# Patient Record
Sex: FEMALE | Race: WHITE | NOT HISPANIC OR LATINO | Employment: FULL TIME | URBAN - METROPOLITAN AREA
[De-identification: names, ages, dates, MRNs, and addresses within clinical notes are randomized per-mention and may not be internally consistent; named-entity substitution may affect disease eponyms.]

---

## 2019-08-01 ENCOUNTER — LAB (OUTPATIENT)
Dept: LAB | Facility: HOSPITAL | Age: 39
End: 2019-08-01

## 2019-08-01 ENCOUNTER — HOSPITAL ENCOUNTER (OUTPATIENT)
Dept: CT IMAGING | Facility: HOSPITAL | Age: 39
Discharge: HOME | End: 2019-08-01
Admitting: INTERNAL MEDICINE

## 2019-08-01 ENCOUNTER — OFFICE VISIT (OUTPATIENT)
Dept: CARDIOLOGY | Facility: CLINIC | Age: 39
End: 2019-08-01

## 2019-08-01 VITALS
SYSTOLIC BLOOD PRESSURE: 100 MMHG | DIASTOLIC BLOOD PRESSURE: 62 MMHG | HEIGHT: 72 IN | BODY MASS INDEX: 27.55 KG/M2 | HEART RATE: 88 BPM | WEIGHT: 203.4 LBS

## 2019-08-01 DIAGNOSIS — R07.2 PRECORDIAL PAIN: ICD-10-CM

## 2019-08-01 DIAGNOSIS — R06.09 DYSPNEA ON EXERTION: ICD-10-CM

## 2019-08-01 DIAGNOSIS — R79.89 ELEVATED D-DIMER: ICD-10-CM

## 2019-08-01 DIAGNOSIS — I10 ESSENTIAL HYPERTENSION: Primary | ICD-10-CM

## 2019-08-01 DIAGNOSIS — I10 ESSENTIAL HYPERTENSION: ICD-10-CM

## 2019-08-01 DIAGNOSIS — R00.2 PALPITATIONS: ICD-10-CM

## 2019-08-01 DIAGNOSIS — R06.09 DYSPNEA ON EXERTION: Primary | ICD-10-CM

## 2019-08-01 PROBLEM — R60.0 LOCALIZED EDEMA: Status: ACTIVE | Noted: 2019-08-01

## 2019-08-01 PROBLEM — R09.89 LABILE BLOOD PRESSURE: Status: ACTIVE | Noted: 2019-08-01

## 2019-08-01 LAB
ALBUMIN SERPL-MCNC: 4.1 G/DL (ref 3.5–5.2)
ALBUMIN/GLOB SERPL: 1.1 G/DL
ALP SERPL-CCNC: 57 U/L (ref 39–117)
ALT SERPL W P-5'-P-CCNC: 13 U/L (ref 1–33)
ANION GAP SERPL CALCULATED.3IONS-SCNC: 13.3 MMOL/L (ref 5–15)
AST SERPL-CCNC: 18 U/L (ref 1–32)
BASOPHILS # BLD AUTO: 0.04 10*3/MM3 (ref 0–0.2)
BASOPHILS NFR BLD AUTO: 0.5 % (ref 0–1.5)
BILIRUB SERPL-MCNC: 0.3 MG/DL (ref 0.2–1.2)
BUN BLD-MCNC: 14 MG/DL (ref 6–20)
BUN/CREAT SERPL: 16.1 (ref 7–25)
CALCIUM SPEC-SCNC: 9.7 MG/DL (ref 8.6–10.5)
CHLORIDE SERPL-SCNC: 104 MMOL/L (ref 98–107)
CHOLEST SERPL-MCNC: 179 MG/DL (ref 0–200)
CO2 SERPL-SCNC: 23.7 MMOL/L (ref 22–29)
CREAT BLD-MCNC: 0.87 MG/DL (ref 0.57–1)
CREAT BLDA-MCNC: 0.8 MG/DL (ref 0.6–1.3)
D DIMER PPP FEU-MCNC: 1.1 MCGFEU/ML (ref 0–0.49)
DEPRECATED RDW RBC AUTO: 45.5 FL (ref 37–54)
EOSINOPHIL # BLD AUTO: 0.03 10*3/MM3 (ref 0–0.4)
EOSINOPHIL NFR BLD AUTO: 0.4 % (ref 0.3–6.2)
ERYTHROCYTE [DISTWIDTH] IN BLOOD BY AUTOMATED COUNT: 13.3 % (ref 12.3–15.4)
GFR SERPL CREATININE-BSD FRML MDRD: 74 ML/MIN/1.73
GLOBULIN UR ELPH-MCNC: 3.6 GM/DL
GLUCOSE BLD-MCNC: 98 MG/DL (ref 65–99)
HCT VFR BLD AUTO: 43.6 % (ref 34–46.6)
HDLC SERPL-MCNC: 55 MG/DL (ref 40–60)
HGB BLD-MCNC: 13.8 G/DL (ref 12–15.9)
IMM GRANULOCYTES # BLD AUTO: 0.03 10*3/MM3 (ref 0–0.05)
IMM GRANULOCYTES NFR BLD AUTO: 0.4 % (ref 0–0.5)
LDLC SERPL CALC-MCNC: 95 MG/DL (ref 0–100)
LDLC/HDLC SERPL: 1.73 {RATIO}
LYMPHOCYTES # BLD AUTO: 1.58 10*3/MM3 (ref 0.7–3.1)
LYMPHOCYTES NFR BLD AUTO: 20.3 % (ref 19.6–45.3)
MCH RBC QN AUTO: 29.3 PG (ref 26.6–33)
MCHC RBC AUTO-ENTMCNC: 31.7 G/DL (ref 31.5–35.7)
MCV RBC AUTO: 92.6 FL (ref 79–97)
MONOCYTES # BLD AUTO: 0.46 10*3/MM3 (ref 0.1–0.9)
MONOCYTES NFR BLD AUTO: 5.9 % (ref 5–12)
NEUTROPHILS # BLD AUTO: 5.63 10*3/MM3 (ref 1.7–7)
NEUTROPHILS NFR BLD AUTO: 72.5 % (ref 42.7–76)
NRBC BLD AUTO-RTO: 0 /100 WBC (ref 0–0.2)
PLATELET # BLD AUTO: 335 10*3/MM3 (ref 140–450)
PMV BLD AUTO: 10.2 FL (ref 6–12)
POTASSIUM BLD-SCNC: 4.6 MMOL/L (ref 3.5–5.2)
PROT SERPL-MCNC: 7.7 G/DL (ref 6–8.5)
RBC # BLD AUTO: 4.71 10*6/MM3 (ref 3.77–5.28)
SODIUM BLD-SCNC: 141 MMOL/L (ref 136–145)
T-UPTAKE NFR SERPL: 1.31 TBI (ref 0.8–1.3)
T4 SERPL-MCNC: 9.83 MCG/DL (ref 4.5–11.7)
TRIGL SERPL-MCNC: 143 MG/DL (ref 0–150)
TSH SERPL DL<=0.05 MIU/L-ACNC: 1.89 MIU/ML (ref 0.27–4.2)
VLDLC SERPL-MCNC: 28.6 MG/DL (ref 5–40)
WBC NRBC COR # BLD: 7.77 10*3/MM3 (ref 3.4–10.8)

## 2019-08-01 PROCEDURE — 84443 ASSAY THYROID STIM HORMONE: CPT | Performed by: INTERNAL MEDICINE

## 2019-08-01 PROCEDURE — 0 IOPAMIDOL PER 1 ML: Performed by: INTERNAL MEDICINE

## 2019-08-01 PROCEDURE — 82565 ASSAY OF CREATININE: CPT

## 2019-08-01 PROCEDURE — 99204 OFFICE O/P NEW MOD 45 MIN: CPT | Performed by: INTERNAL MEDICINE

## 2019-08-01 PROCEDURE — 85025 COMPLETE CBC W/AUTO DIFF WBC: CPT

## 2019-08-01 PROCEDURE — 80061 LIPID PANEL: CPT

## 2019-08-01 PROCEDURE — 84479 ASSAY OF THYROID (T3 OR T4): CPT | Performed by: INTERNAL MEDICINE

## 2019-08-01 PROCEDURE — 93000 ELECTROCARDIOGRAM COMPLETE: CPT | Performed by: INTERNAL MEDICINE

## 2019-08-01 PROCEDURE — 84436 ASSAY OF TOTAL THYROXINE: CPT | Performed by: INTERNAL MEDICINE

## 2019-08-01 PROCEDURE — 71275 CT ANGIOGRAPHY CHEST: CPT

## 2019-08-01 PROCEDURE — 85379 FIBRIN DEGRADATION QUANT: CPT

## 2019-08-01 PROCEDURE — 36415 COLL VENOUS BLD VENIPUNCTURE: CPT | Performed by: INTERNAL MEDICINE

## 2019-08-01 PROCEDURE — 80053 COMPREHEN METABOLIC PANEL: CPT | Performed by: INTERNAL MEDICINE

## 2019-08-01 RX ORDER — HYDROCHLOROTHIAZIDE 12.5 MG/1
12.5 CAPSULE, GELATIN COATED ORAL DAILY
Qty: 90 CAPSULE | Refills: 3 | Status: SHIPPED | OUTPATIENT
Start: 2019-08-01 | End: 2020-07-01

## 2019-08-01 RX ORDER — LEVONORGESTREL AND ETHINYL ESTRADIOL 0.15-0.03
1 KIT ORAL DAILY
COMMUNITY
Start: 2019-05-23 | End: 2022-05-23

## 2019-08-01 RX ORDER — LISINOPRIL AND HYDROCHLOROTHIAZIDE 12.5; 1 MG/1; MG/1
1 TABLET ORAL DAILY
Refills: 3 | COMMUNITY
Start: 2019-07-16 | End: 2019-08-01

## 2019-08-01 RX ORDER — ASPIRIN 81 MG/1
81 TABLET ORAL DAILY
COMMUNITY
End: 2023-01-06

## 2019-08-01 RX ADMIN — IOPAMIDOL 95 ML: 755 INJECTION, SOLUTION INTRAVENOUS at 15:09

## 2019-08-01 NOTE — PROGRESS NOTES
Date of Office Visit: 2019  Encounter Provider: Latisha Cummings MD  Place of Service: [facility] CARDIOLOGY  Patient Name: Jeanne Wei  :1980      Patient ID:  Jeanne Wei is a 38 y.o. female is here for edema and hypertension.           History of Present Illness    She has had left lower extremity swelling.  She has a history of deep venous thrombus in the greater left saphenous vein.  She had a venous duplex study done successfully 19 which showed no evidence of left lower extremity venous thrombus.    She had venous thrombosis 2 years ago and was treated with aspirin.  She started developing recurrent edema in her leg 2019.  This did not bother her so much although got worse when the weather got warm.  Then about 1 month ago, she began having nausea and chest tightness.  It got acutely worse last Tuesday.  She said her chest was really tight and her heart was pounding.  She was dizzy.  It lasted about 2 hours before she said to go the doctor.  When she got there, heart rate was in the 140s and her blood pressure was 142/98.  They started her on lisinopril HCTZ and did an EKG.  There was no other testing done at that time.  She is here for further evaluation.  She said she started feeling better yesterday but she still intermittently notices her heart pounding and racing.  She has had no syncope but has had some dizziness associated with this.  Also since being on the blood pressure medication, she is noticed that she dizzy is dizzy if she stands up.  Her chest tightness is better but not gone.  Her breathing is fine.    There is a family history of heart disease in her grandparents.  Hypertension runs in her parents.  She is  does not have children works as an .  She uses no alcohol, cigarettes or drugs.  She is very active doing yard work and has always felt well and been healthy except for this clot.  They never  determined why she had the clot.    She does have GERD.  She is never had cancer, renal disease, diabetes, stroke, seizure, hyperlipidemia, microinfarction, rheumatic fever heart failure.    Past Medical History:   Diagnosis Date   • GERD (gastroesophageal reflux disease)          No past surgical history on file.    Current Outpatient Medications on File Prior to Visit   Medication Sig Dispense Refill   • aspirin 81 MG EC tablet Take 81 mg by mouth Daily.     • esomeprazole (nexIUM) 20 MG capsule Take 20 mg by mouth Every Morning Before Breakfast.     • levonorgestrel-ethinyl estradiol (SEASONALE) 0.15-0.03 MG per tablet Take 1 tablet by mouth Daily.     • [DISCONTINUED] lisinopril-hydrochlorothiazide (PRINZIDE,ZESTORETIC) 10-12.5 MG per tablet Take 1 tablet by mouth Daily.  3   • [DISCONTINUED] albuterol (PROVENTIL HFA;VENTOLIN HFA) 108 (90 Base) MCG/ACT inhaler Inhale 2 puffs Every 4 (Four) Hours As Needed for Wheezing or Shortness of Air. 1 inhaler 0   • [DISCONTINUED] azithromycin (ZITHROMAX) 250 MG tablet Take 250 mg by mouth Daily. Take 2 tablets the first day, then 1 tablet daily for 4 days.     • [DISCONTINUED] benzonatate (TESSALON) 200 MG capsule Take 200 mg by mouth 3 (Three) Times a Day As Needed for Cough.     • [DISCONTINUED] brompheniramine-pseudoephedrine-DM 30-2-10 MG/5ML syrup Take 5 mL by mouth 3 (Three) Times a Day As Needed for Congestion or Cough. 118 mL 0   • [DISCONTINUED] methocarbamol (ROBAXIN) 750 MG tablet 1-2 tabs PO Q 8hrs prn pain 30 tablet 0   • [DISCONTINUED] norethindrone-ethinyl estradiol-iron (ESTROSTEP FE) 1-20/1-30/1-35 MG-MCG tablet Take  by mouth Daily.     • [DISCONTINUED] predniSONE (DELTASONE) 20 MG tablet 2 tabs daily for 5 days 10 tablet 0   • [DISCONTINUED] promethazine-dextromethorphan (PROMETHAZINE-DM) 6.25-15 MG/5ML syrup Take 5 mL by mouth 4 (Four) Times a Day As Needed for Cough. 118 mL 0     No current facility-administered medications on file prior to visit.   "      Social History     Socioeconomic History   • Marital status:      Spouse name: Not on file   • Number of children: Not on file   • Years of education: Not on file   • Highest education level: Not on file   Tobacco Use   • Smoking status: Never Smoker   • Smokeless tobacco: Never Used   Substance and Sexual Activity   • Alcohol use: No   • Drug use: Defer   • Sexual activity: Defer           Review of Systems   Constitution: Negative.   HENT: Negative for congestion.    Eyes: Negative for vision loss in left eye and vision loss in right eye.   Respiratory: Negative.  Negative for cough, hemoptysis, shortness of breath, sleep disturbances due to breathing, snoring, sputum production and wheezing.    Endocrine: Negative.    Hematologic/Lymphatic: Negative.    Skin: Negative for poor wound healing and rash.   Musculoskeletal: Negative for falls, gout, muscle cramps and myalgias.   Gastrointestinal: Negative for abdominal pain, diarrhea, dysphagia, hematemesis, melena, nausea and vomiting.   Neurological: Negative for excessive daytime sleepiness, dizziness, headaches, light-headedness, loss of balance, seizures and vertigo.   Psychiatric/Behavioral: Negative for depression and substance abuse. The patient is not nervous/anxious.        Procedures    ECG 12 Lead  Date/Time: 8/1/2019 8:38 AM  Performed by: Latisha Cummings MD  Authorized by: Latisha Cummings MD   Comparison: not compared with previous ECG   Previous ECG: no previous ECG available  Rhythm: sinus rhythm    Clinical impression: normal ECG                Objective:      Vitals:    08/01/19 0832 08/01/19 0833   BP: 112/76 100/62   BP Location: Right arm Left arm   Patient Position: Sitting Sitting   Pulse: 88    Weight: 92.3 kg (203 lb 6.4 oz)    Height: 185.4 cm (73\")      Body mass index is 26.84 kg/m².    Physical Exam   Constitutional: She is oriented to person, place, and time. She appears well-developed and well-nourished. No " distress.   HENT:   Head: Normocephalic and atraumatic.   Eyes: Conjunctivae are normal. No scleral icterus.   Neck: Neck supple. No JVD present. Carotid bruit is not present. No thyromegaly present.   Cardiovascular: Normal rate, regular rhythm, S1 normal, S2 normal, normal heart sounds and intact distal pulses.  No extrasystoles are present. PMI is not displaced. Exam reveals no gallop.   No murmur heard.  Pulses:       Carotid pulses are 2+ on the right side, and 2+ on the left side.       Radial pulses are 2+ on the right side, and 2+ on the left side.        Dorsalis pedis pulses are 2+ on the right side, and 2+ on the left side.        Posterior tibial pulses are 2+ on the right side, and 2+ on the left side.   Pulmonary/Chest: Effort normal and breath sounds normal. No respiratory distress. She has no wheezes. She has no rhonchi. She has no rales. She exhibits no tenderness.   Abdominal: Soft. Bowel sounds are normal. She exhibits no distension, no abdominal bruit and no mass. There is no tenderness.   Musculoskeletal: She exhibits no edema or deformity.   Lymphadenopathy:     She has no cervical adenopathy.   Neurological: She is alert and oriented to person, place, and time. No cranial nerve deficit.   Skin: Skin is warm and dry. No rash noted. She is not diaphoretic. No cyanosis. No pallor. Nails show no clubbing.   Psychiatric: She has a normal mood and affect. Judgment normal.   Vitals reviewed.      Lab Review:       Assessment:      Diagnosis Plan   1. Essential hypertension  Comprehensive Metabolic Panel    Lipid Panel   2. Palpitations  Comprehensive Metabolic Panel    D-dimer, Quantitative    CBC & Differential    Thyroid Panel With TSH    Holter Monitor - 24 Hour   3. Precordial pain  Adult Transthoracic Echo Complete W/ Cont if Necessary Per Protocol    Treadmill Stress Test     1. Chest tightness, worse with activity.  Set up stress and echocardiogram  2. Heart pounding and racing.  Set up  Holter, check blood counts, thyroid and electrolytes.  3. Dizziness with standing.  Blood pressure is now low.  We will place her on just HCTZ 12.5 mg daily.  Not sure she will tolerate this either but this will help the edema in her left leg as well as her blood pressure.  4. History of thrombus in the left leg with acute chest pain and heart pounding.  Check a d-dimer.     Plan:       Plan is as above.  We will have her follow-up in 6 weeks with OSVALDO.

## 2019-08-01 NOTE — NURSING NOTE
Dr. Camacho read CTA Chest PE Protocol and stated no PE, This was called to Dr. Cummings and she said patient may go. Ambulated to car with significant other.

## 2019-08-09 ENCOUNTER — TELEPHONE (OUTPATIENT)
Dept: CARDIOLOGY | Facility: CLINIC | Age: 39
End: 2019-08-09

## 2019-08-09 NOTE — TELEPHONE ENCOUNTER
----- Message from Latisha Cummings MD sent at 8/2/2019  1:18 PM EDT -----  pls get her holter for next week.     Start pepcid 20mg bid - OTC    rm

## 2019-08-13 ENCOUNTER — HOSPITAL ENCOUNTER (OUTPATIENT)
Dept: CARDIOLOGY | Facility: HOSPITAL | Age: 39
Discharge: HOME | End: 2019-08-13

## 2019-08-13 ENCOUNTER — HOSPITAL ENCOUNTER (OUTPATIENT)
Dept: CARDIOLOGY | Facility: HOSPITAL | Age: 39
Discharge: HOME | End: 2019-08-13
Admitting: INTERNAL MEDICINE

## 2019-08-13 ENCOUNTER — TELEPHONE (OUTPATIENT)
Dept: CARDIOLOGY | Facility: CLINIC | Age: 39
End: 2019-08-13

## 2019-08-13 VITALS
BODY MASS INDEX: 26.82 KG/M2 | HEART RATE: 83 BPM | DIASTOLIC BLOOD PRESSURE: 78 MMHG | HEIGHT: 72 IN | WEIGHT: 198 LBS | SYSTOLIC BLOOD PRESSURE: 112 MMHG | OXYGEN SATURATION: 99 %

## 2019-08-13 DIAGNOSIS — R07.2 PRECORDIAL PAIN: ICD-10-CM

## 2019-08-13 LAB
AORTIC ROOT ANNULUS: 2 CM
ASCENDING AORTA: 2.6 CM
BH CV ECHO MEAS - ACS: 1.8 CM
BH CV ECHO MEAS - AO MAX PG (FULL): 2 MMHG
BH CV ECHO MEAS - AO MAX PG: 5.8 MMHG
BH CV ECHO MEAS - AO MEAN PG (FULL): 1.2 MMHG
BH CV ECHO MEAS - AO MEAN PG: 3.6 MMHG
BH CV ECHO MEAS - AO ROOT AREA (BSA CORRECTED): 1.3
BH CV ECHO MEAS - AO ROOT AREA: 6.5 CM^2
BH CV ECHO MEAS - AO ROOT DIAM: 2.9 CM
BH CV ECHO MEAS - AO V2 MAX: 120.8 CM/SEC
BH CV ECHO MEAS - AO V2 MEAN: 86.6 CM/SEC
BH CV ECHO MEAS - AO V2 VTI: 24.7 CM
BH CV ECHO MEAS - AVA(I,A): 2.5 CM^2
BH CV ECHO MEAS - AVA(I,D): 2.5 CM^2
BH CV ECHO MEAS - AVA(V,A): 2.3 CM^2
BH CV ECHO MEAS - AVA(V,D): 2.3 CM^2
BH CV ECHO MEAS - BSA(HAYCOCK): 2.2 M^2
BH CV ECHO MEAS - BSA: 2.1 M^2
BH CV ECHO MEAS - BZI_BMI: 26.1 KILOGRAMS/M^2
BH CV ECHO MEAS - BZI_METRIC_HEIGHT: 185.4 CM
BH CV ECHO MEAS - BZI_METRIC_WEIGHT: 89.8 KG
BH CV ECHO MEAS - EDV(MOD-SP2): 92 ML
BH CV ECHO MEAS - EDV(MOD-SP4): 105 ML
BH CV ECHO MEAS - EDV(TEICH): 99.7 ML
BH CV ECHO MEAS - EF(CUBED): 70 %
BH CV ECHO MEAS - EF(MOD-BP): 60 %
BH CV ECHO MEAS - EF(MOD-SP2): 63 %
BH CV ECHO MEAS - EF(MOD-SP4): 58.1 %
BH CV ECHO MEAS - EF(TEICH): 61.6 %
BH CV ECHO MEAS - ESV(MOD-SP2): 34 ML
BH CV ECHO MEAS - ESV(MOD-SP4): 44 ML
BH CV ECHO MEAS - ESV(TEICH): 38.3 ML
BH CV ECHO MEAS - FS: 33.1 %
BH CV ECHO MEAS - IVS/LVPW: 1.1
BH CV ECHO MEAS - IVSD: 0.92 CM
BH CV ECHO MEAS - LAT PEAK E' VEL: 11 CM/SEC
BH CV ECHO MEAS - LV DIASTOLIC VOL/BSA (35-75): 49 ML/M^2
BH CV ECHO MEAS - LV MASS(C)D: 136.7 GRAMS
BH CV ECHO MEAS - LV MASS(C)DI: 63.8 GRAMS/M^2
BH CV ECHO MEAS - LV MAX PG: 3.9 MMHG
BH CV ECHO MEAS - LV MEAN PG: 2.4 MMHG
BH CV ECHO MEAS - LV SYSTOLIC VOL/BSA (12-30): 20.5 ML/M^2
BH CV ECHO MEAS - LV V1 MAX: 98.5 CM/SEC
BH CV ECHO MEAS - LV V1 MEAN: 72.5 CM/SEC
BH CV ECHO MEAS - LV V1 VTI: 21.4 CM
BH CV ECHO MEAS - LVIDD: 4.6 CM
BH CV ECHO MEAS - LVIDS: 3.1 CM
BH CV ECHO MEAS - LVLD AP2: 6.5 CM
BH CV ECHO MEAS - LVLD AP4: 7 CM
BH CV ECHO MEAS - LVLS AP2: 4.9 CM
BH CV ECHO MEAS - LVLS AP4: 5.7 CM
BH CV ECHO MEAS - LVOT AREA (M): 2.8 CM^2
BH CV ECHO MEAS - LVOT AREA: 2.9 CM^2
BH CV ECHO MEAS - LVOT DIAM: 1.9 CM
BH CV ECHO MEAS - LVPWD: 0.85 CM
BH CV ECHO MEAS - MED PEAK E' VEL: 9 CM/SEC
BH CV ECHO MEAS - MV A DUR: 0.11 SEC
BH CV ECHO MEAS - MV A MAX VEL: 54.8 CM/SEC
BH CV ECHO MEAS - MV DEC SLOPE: 385.5 CM/SEC^2
BH CV ECHO MEAS - MV DEC TIME: 0.17 SEC
BH CV ECHO MEAS - MV E MAX VEL: 70.8 CM/SEC
BH CV ECHO MEAS - MV E/A: 1.3
BH CV ECHO MEAS - MV MAX PG: 2.4 MMHG
BH CV ECHO MEAS - MV MEAN PG: 1.4 MMHG
BH CV ECHO MEAS - MV P1/2T MAX VEL: 85.5 CM/SEC
BH CV ECHO MEAS - MV P1/2T: 65 MSEC
BH CV ECHO MEAS - MV V2 MAX: 77.4 CM/SEC
BH CV ECHO MEAS - MV V2 MEAN: 55.2 CM/SEC
BH CV ECHO MEAS - MV V2 VTI: 21 CM
BH CV ECHO MEAS - MVA P1/2T LCG: 2.6 CM^2
BH CV ECHO MEAS - MVA(P1/2T): 3.4 CM^2
BH CV ECHO MEAS - MVA(VTI): 2.9 CM^2
BH CV ECHO MEAS - PA MAX PG (FULL): 7 MMHG
BH CV ECHO MEAS - PA MAX PG: 10.1 MMHG
BH CV ECHO MEAS - PA V2 MAX: 158.8 CM/SEC
BH CV ECHO MEAS - PULM A REVS DUR: 0.11 SEC
BH CV ECHO MEAS - PULM A REVS VEL: 34.4 CM/SEC
BH CV ECHO MEAS - PULM DIAS VEL: 54.8 CM/SEC
BH CV ECHO MEAS - PULM S/D: 1.2
BH CV ECHO MEAS - PULM SYS VEL: 67.4 CM/SEC
BH CV ECHO MEAS - PVA(V,A): 1.6 CM^2
BH CV ECHO MEAS - PVA(V,D): 1.6 CM^2
BH CV ECHO MEAS - QP/QS: 0.85
BH CV ECHO MEAS - RAP SYSTOLE: 3 MMHG
BH CV ECHO MEAS - RV MAX PG: 3.1 MMHG
BH CV ECHO MEAS - RV MEAN PG: 1.9 MMHG
BH CV ECHO MEAS - RV V1 MAX: 88.3 CM/SEC
BH CV ECHO MEAS - RV V1 MEAN: 63.6 CM/SEC
BH CV ECHO MEAS - RV V1 VTI: 18.6 CM
BH CV ECHO MEAS - RVOT AREA: 2.8 CM^2
BH CV ECHO MEAS - RVOT DIAM: 1.9 CM
BH CV ECHO MEAS - RVSP: 27 MMHG
BH CV ECHO MEAS - SI(AO): 75 ML/M^2
BH CV ECHO MEAS - SI(CUBED): 32.8 ML/M^2
BH CV ECHO MEAS - SI(LVOT): 28.6 ML/M^2
BH CV ECHO MEAS - SI(MOD-SP2): 27.1 ML/M^2
BH CV ECHO MEAS - SI(MOD-SP4): 28.5 ML/M^2
BH CV ECHO MEAS - SI(TEICH): 28.7 ML/M^2
BH CV ECHO MEAS - SUP REN AO DIAM: 1.6 CM
BH CV ECHO MEAS - SV(AO): 160.7 ML
BH CV ECHO MEAS - SV(CUBED): 70.3 ML
BH CV ECHO MEAS - SV(LVOT): 61.3 ML
BH CV ECHO MEAS - SV(MOD-SP2): 58 ML
BH CV ECHO MEAS - SV(MOD-SP4): 61 ML
BH CV ECHO MEAS - SV(RVOT): 52.4 ML
BH CV ECHO MEAS - SV(TEICH): 61.5 ML
BH CV ECHO MEAS - TAPSE (>1.6): 2 CM2
BH CV ECHO MEAS - TR MAX VEL: 247 CM/SEC
BH CV ECHO MEASUREMENTS AVERAGE E/E' RATIO: 7.08
BH CV STRESS BP STAGE 1: NORMAL
BH CV STRESS BP STAGE 2: NORMAL
BH CV STRESS BP STAGE 3: NORMAL
BH CV STRESS DURATION MIN STAGE 1: 3
BH CV STRESS DURATION MIN STAGE 2: 3
BH CV STRESS DURATION MIN STAGE 3: 2
BH CV STRESS DURATION SEC STAGE 1: 0
BH CV STRESS DURATION SEC STAGE 2: 0
BH CV STRESS DURATION SEC STAGE 3: 0
BH CV STRESS GRADE STAGE 1: 10
BH CV STRESS GRADE STAGE 2: 12
BH CV STRESS GRADE STAGE 3: 14
BH CV STRESS HR STAGE 1: 117
BH CV STRESS HR STAGE 2: 148
BH CV STRESS HR STAGE 3: 176
BH CV STRESS METS STAGE 1: 5
BH CV STRESS METS STAGE 2: 7.5
BH CV STRESS METS STAGE 3: 10
BH CV STRESS PROTOCOL 1: NORMAL
BH CV STRESS RECOVERY BP: NORMAL MMHG
BH CV STRESS RECOVERY HR: 117 BPM
BH CV STRESS SPEED STAGE 1: 1.7
BH CV STRESS SPEED STAGE 2: 2.5
BH CV STRESS SPEED STAGE 3: 3.4
BH CV STRESS STAGE 1: 1
BH CV STRESS STAGE 2: 2
BH CV STRESS STAGE 3: 3
BH CV XLRA - RV BASE: 2.8 CM
BH CV XLRA - TDI S': 15 CM/SEC
LEFT ATRIUM VOLUME INDEX: 19 ML/M2
LEFT ATRIUM VOLUME: 39 CM3
LV EF 2D ECHO EST: 60 %
MAXIMAL PREDICTED HEART RATE: 184 BPM
MAXIMAL PREDICTED HEART RATE: 184 BPM
PERCENT MAX PREDICTED HR: 95.65 %
SINUS: 2.5 CM
STJ: 2.6 CM
STRESS BASELINE BP: NORMAL MMHG
STRESS BASELINE HR: 87 BPM
STRESS PERCENT HR: 113 %
STRESS POST ESTIMATED WORKLOAD: 9 METS
STRESS POST EXERCISE DUR MIN: 8 MIN
STRESS POST EXERCISE DUR SEC: 0 SEC
STRESS POST PEAK BP: NORMAL MMHG
STRESS POST PEAK HR: 176 BPM
STRESS TARGET HR: 156 BPM
STRESS TARGET HR: 156 BPM

## 2019-08-13 PROCEDURE — 93018 CV STRESS TEST I&R ONLY: CPT | Performed by: INTERNAL MEDICINE

## 2019-08-13 PROCEDURE — 93306 TTE W/DOPPLER COMPLETE: CPT | Performed by: INTERNAL MEDICINE

## 2019-08-13 PROCEDURE — 93016 CV STRESS TEST SUPVJ ONLY: CPT | Performed by: INTERNAL MEDICINE

## 2019-08-13 PROCEDURE — 93017 CV STRESS TEST TRACING ONLY: CPT

## 2019-08-13 PROCEDURE — 93306 TTE W/DOPPLER COMPLETE: CPT

## 2019-08-13 PROCEDURE — 25010000002 PERFLUTREN (DEFINITY) 8.476 MG IN SODIUM CHLORIDE 0.9 % 10 ML INJECTION: Performed by: INTERNAL MEDICINE

## 2019-08-13 RX ADMIN — PERFLUTREN 1.5 ML: 6.52 INJECTION, SUSPENSION INTRAVENOUS at 08:21

## 2019-08-13 NOTE — TELEPHONE ENCOUNTER
08/13/19  2:49 PM  Jeanne Wei  1980  Home Phone [phone]   Work Phone [phone]       Jeanne Wei is a patient of Dr Woody.  I had called her today to discuss her recent testing that was normal.    She would like to know if there was a reason for the elevation of the D-dimer (1.10), since there was no blood clot found on the CT angiogram of the chest?  And if she will need further testing?   Thanks  Digna Del Valle RN  Triage nurse

## 2019-08-21 ENCOUNTER — TELEPHONE (OUTPATIENT)
Dept: CARDIOLOGY | Facility: CLINIC | Age: 39
End: 2019-08-21

## 2019-08-21 NOTE — TELEPHONE ENCOUNTER
Pt called and c/o frequently palpitations for a period of time and when it happen she feels dizzy, hot flash elevated HR. Pt is c/o swelling of the hand too.....Does pt need to f/u with NP. She don' have scheduled appointment.....Please advise    Current med list  HCTZ 12.5 MG         Thanks  Fatmata MUNGUIA

## 2019-08-23 ENCOUNTER — OFFICE VISIT (OUTPATIENT)
Dept: CARDIOLOGY | Facility: CLINIC | Age: 39
End: 2019-08-23

## 2019-08-23 VITALS
HEART RATE: 86 BPM | WEIGHT: 205.8 LBS | HEIGHT: 72 IN | RESPIRATION RATE: 16 BRPM | BODY MASS INDEX: 27.87 KG/M2 | DIASTOLIC BLOOD PRESSURE: 76 MMHG | SYSTOLIC BLOOD PRESSURE: 112 MMHG

## 2019-08-23 DIAGNOSIS — R42 DIZZINESS: ICD-10-CM

## 2019-08-23 DIAGNOSIS — R00.2 INTERMITTENT PALPITATIONS: Primary | ICD-10-CM

## 2019-08-23 DIAGNOSIS — I10 ESSENTIAL HYPERTENSION: ICD-10-CM

## 2019-08-23 DIAGNOSIS — R06.09 DYSPNEA ON EXERTION: ICD-10-CM

## 2019-08-23 PROCEDURE — 99214 OFFICE O/P EST MOD 30 MIN: CPT | Performed by: NURSE PRACTITIONER

## 2019-08-23 PROCEDURE — 93000 ELECTROCARDIOGRAM COMPLETE: CPT | Performed by: NURSE PRACTITIONER

## 2019-08-23 NOTE — PROGRESS NOTES
Date of Office Visit: 2019  Encounter Provider: OSVALDO Watson  Place of Service: [facility] CARDIOLOGY  Patient Name: Jeanne Wei  :1980    Chief Complaint   Patient presents with   • Palpitations   • Dizziness   :     HPI: Jeanne Wei is a 38 y.o. female  with history of palpitation, hypertension, GERD, atypical chest pain, and deep vein thrombosis in the greater left saphenous vein.  She is followed by Dr. Cummings.  I will be seeing her for the first time today and have reviewed her medical record.  The family history of heart disease in her grandparents.  Hypertension runs in her parents.    She had venous thrombosis  with vascular started developing recurrent edema venous duplex of the lower extremity 2019 which showed no evidence of left lower extremity venous thromboses.  In July she began having nausea and chest tightness which got worse 40s when she arrived to her PCP and blood pressure was 142/98.  She was started on lisinopril and HCTZ.  ECG was performed.  She then had intermittent heart pounding and racing.  Thyroid panel was normal.  D-dimer was slightly elevated but CT angiogram of the chest negative for pulmonary embolism or aortic disease.  Lipid panel was unremarkable LDL 95.  24-hour Holter monitor was normal.  Patient complained of dizziness, chest pain and flushing had no correlations.  PVCs occurred rarely with ventricular couplets and bigeminy.  Echocardiogram showed normal left ventricular systolic function with an EF of 60% and no valvular disease.  Treadmill stress test showed no ECG evidence of myocardial ischemia, negative clinical evidence and findings consistent with normal ECG stress test.  She exercised for 8 minutes achieved 95% of maximum predicted heart rate exercised at 9 metabolic equivalents.  Blood pressure and heart rate had a normal response.  Her blood pressure was 100/62 so lisinopril was stopped and  "she was placed on 12.5 mg hydrochlorothiazide.    She presents today for reevaluation.  She has had increased intermittent palpitations.  These do not occur daily but when they do occur she describes them as a skipped or fast beat.  She has associated dizziness, fatigue and feels near syncopal but no praveen syncope.  She also has a flushing or \"hot\" sensation.  These do not seem to have correlation with her menstrual cycle.  She has actually cut back on eating diet drinks and has not had one in the last month.  She denies chest pain tightness pressure.  She has some intermittent swelling in the left leg which is unchanged.  She has history of snoring no witnessed apnea.  She has occasional morning headache has never been tested for sleep apnea but does not think she has issues with sleep.      Allergies   Allergen Reactions   • Amoxicillin Nausea And Vomiting   • Penicillins Other (See Comments)     Rash,migraine,vomiting       Past Medical History:   Diagnosis Date   • Chest pain    • Dizziness    • GERD (gastroesophageal reflux disease)    • Hypertension    • Palpitation    • PVC (premature ventricular contraction)        History reviewed. No pertinent surgical history.      Family and social history reviewed.     Review of Systems   Constitution: Positive for malaise/fatigue.   Cardiovascular: Positive for leg swelling and palpitations.   Neurological: Positive for dizziness and light-headedness.     All other systems were reviewed and are negative          Objective:     Vitals:    08/23/19 1445   BP: 112/76   BP Location: Left arm   Patient Position: Sitting   Cuff Size: Adult   Pulse: 86   Resp: 16   Weight: 93.4 kg (205 lb 12.8 oz)   Height: 185.4 cm (73\")     Body mass index is 27.15 kg/m².    PHYSICAL EXAM:  Physical Exam   Constitutional: She is oriented to person, place, and time. She appears well-developed and well-nourished. No distress.   HENT:   Head: Normocephalic.   Eyes: Conjunctivae are normal. "   Neck: Normal range of motion. No JVD present.   Cardiovascular: Normal rate, regular rhythm, normal heart sounds and intact distal pulses.   No murmur heard.  Pulses:       Carotid pulses are 2+ on the right side, and 2+ on the left side.       Radial pulses are 2+ on the right side, and 2+ on the left side.        Posterior tibial pulses are 2+ on the right side, and 2+ on the left side.   Pulmonary/Chest: Effort normal and breath sounds normal. No respiratory distress. She has no wheezes. She has no rhonchi. She has no rales. She exhibits no tenderness.   Abdominal: Soft. Bowel sounds are normal. She exhibits no distension.   Musculoskeletal: Normal range of motion. She exhibits no edema.   Neurological: She is alert and oriented to person, place, and time.   Skin: Skin is warm, dry and intact. No rash noted. She is not diaphoretic. No cyanosis.   Psychiatric: She has a normal mood and affect. Her behavior is normal. Judgment and thought content normal.         ECG 12 Lead  Date/Time: 8/23/2019 4:13 PM  Performed by: Millicent Pereyra APRN  Authorized by: Millicent Pereyra APRN   Comparison: compared with previous ECG from 8/1/2019  Similar to previous ECG  Rhythm: sinus rhythm  Ectopy: unifocal PVCs  Rate: normal  QRS axis: normal    Clinical impression: abnormal EKG            Current Outpatient Medications   Medication Sig Dispense Refill   • aspirin 81 MG EC tablet Take 81 mg by mouth Daily.     • esomeprazole (nexIUM) 20 MG capsule Take 20 mg by mouth Every Morning Before Breakfast.     • hydrochlorothiazide (MICROZIDE) 12.5 MG capsule Take 1 capsule by mouth Daily. 90 capsule 3   • levonorgestrel-ethinyl estradiol (SEASONALE) 0.15-0.03 MG per tablet Take 1 tablet by mouth Daily.       No current facility-administered medications for this visit.      Assessment:       Diagnosis Plan   1. Intermittent palpitations  Cardiac Event Monitor   2. Essential hypertension     3. Dyspnea on exertion     4. Dizziness   Cardiac Event Monitor        Orders Placed This Encounter   Procedures   • Cardiac Event Monitor     Standing Status:   Future     Number of Occurrences:   1     Standing Expiration Date:   8/22/2020     Order Specific Question:   Reason for exam?     Answer:   Palpitations     Order Specific Question:   Reason for exam?     Answer:   Dizziness   • ECG 12 Lead     This order was created via procedure documentation         Plan:       1.  Intermittent palpitations with unremarkable 24-hour Holter earlier this month. She is having recurrent palpitations with associated dizziness, hot flash, and near syncope.  She will have a 30-day event monitor placed today  2.  Hypertension we stopped lisinopril/HCTZ due to low blood pressure.  Now on hydrochlorothiazide 12.5 mg well controlled  3.  Atypical chest pain-normal treadmill exercise stress test August 2019, normal left ventricular systolic function on echo with no valvular disease.  4.  History of left lower extremity thromboses approximately 2017- duplex June 2019  5.  Birth control use      Follow up to be determined once  Event monitor is resulted.         It has been a pleasure to participate in this patient's care.      Thank you,  OSVALDO Watson      **Jose Disclaimer:**  Much of this encounter note is an electronic transcription/translation of spoken language to printed text. The electronic translation of spoken language may permit erroneous, or at times, nonsensical words or phrases to be inadvertently transcribed. Although I have reviewed the note for such errors, some may still exist.

## 2019-09-10 ENCOUNTER — CLINICAL SUPPORT (OUTPATIENT)
Dept: CARDIOLOGY | Facility: CLINIC | Age: 39
End: 2019-09-10

## 2019-09-10 DIAGNOSIS — I49.3 PVC (PREMATURE VENTRICULAR CONTRACTION): Primary | ICD-10-CM

## 2019-09-10 DIAGNOSIS — R00.2 PALPITATIONS: ICD-10-CM

## 2019-09-10 PROCEDURE — 93000 ELECTROCARDIOGRAM COMPLETE: CPT | Performed by: NURSE PRACTITIONER

## 2019-09-10 NOTE — PROGRESS NOTES
Procedure     ECG 12 Lead  Date/Time: 9/10/2019 11:28 AM  Performed by: Millicent Pereyra APRN  Authorized by: Millicent Pereyra APRN   Comparison: compared with previous ECG   Similar to previous ECG  Rhythm: sinus rhythm  Ectopy: unifocal PVCs and trigeminy  Rate: normal  QRS axis: normal  Other findings: non-specific ST-T wave changes    Clinical impression: non-specific ECG  Comments: No significant change        patient here for EKG  Having Palpitations  She is wearing ZIo patch. We will continue the ZIo until the end of monitoring then evaluate PVC burden and discuss beta blockade therapy at that time.   Discussed with ROXANA Chicas- no changes today patient is ok to leave.    AL

## 2019-09-25 ENCOUNTER — TELEPHONE (OUTPATIENT)
Dept: CARDIOLOGY | Facility: CLINIC | Age: 39
End: 2019-09-25

## 2019-09-25 PROBLEM — I49.3 UNIFOCAL PVCS: Status: ACTIVE | Noted: 2019-09-25

## 2019-09-25 RX ORDER — METOPROLOL SUCCINATE 25 MG/1
12.5 TABLET, EXTENDED RELEASE ORAL NIGHTLY
Qty: 45 TABLET | Refills: 3 | Status: SHIPPED | OUTPATIENT
Start: 2019-09-25 | End: 2019-11-25

## 2019-09-25 NOTE — TELEPHONE ENCOUNTER
Spoke with patient. I am sending and metoprolol succinate 12.5 mg for patient to take daily at night.           Danielle- Please arrange 6-week follow-up with me and  Schedule for six-month follow-up with Dr. Cummings, if available .

## 2019-09-30 NOTE — TELEPHONE ENCOUNTER
Patient called the office today with concerns about her metoprolol 25 mg 0.5mg nightly that was started on 09/26/19.  She stated that every since she stared the medication she is not feeling any better than what she was. She's been feeling really SOB, fatigue all the time, dizzy, denies any chest pain. Her B/P has been running 100/115's- 60-80's. Her HR is running in the 50-60's. The highest HR was 115-118 one time.     Patient can be reached at [phone]

## 2019-09-30 NOTE — TELEPHONE ENCOUNTER
Please let her know that her BP and HR are ok. She had a lot of these complaints prior to starting 1/2 tablet at night. Would give it another 1-2 weeks before trying another agent.    Contraindicated

## 2019-11-05 ENCOUNTER — OFFICE VISIT (OUTPATIENT)
Dept: CARDIOLOGY | Facility: CLINIC | Age: 39
End: 2019-11-05

## 2019-11-05 VITALS
BODY MASS INDEX: 28.31 KG/M2 | HEIGHT: 72 IN | SYSTOLIC BLOOD PRESSURE: 110 MMHG | DIASTOLIC BLOOD PRESSURE: 70 MMHG | WEIGHT: 209 LBS | HEART RATE: 83 BPM

## 2019-11-05 DIAGNOSIS — I10 ESSENTIAL HYPERTENSION: ICD-10-CM

## 2019-11-05 DIAGNOSIS — I49.3 PVC (PREMATURE VENTRICULAR CONTRACTION): Primary | ICD-10-CM

## 2019-11-05 PROCEDURE — 93000 ELECTROCARDIOGRAM COMPLETE: CPT | Performed by: NURSE PRACTITIONER

## 2019-11-05 PROCEDURE — 99213 OFFICE O/P EST LOW 20 MIN: CPT | Performed by: NURSE PRACTITIONER

## 2019-11-05 NOTE — PROGRESS NOTES
Date of Office Visit: 19  Encounter Provider: OSVALDO Watson  Place of Service: [facility] CARDIOLOGY  Patient Name: Jeanne Wei  :1980    Chief Complaint   Patient presents with   • Palpitations   • Dizziness   • Follow-up   :     HPI: Jeanne Wei is a 38 y.o. female  with history of palpitation, hypertension, GERD, atypical chest pain, and deep vein thrombosis in the greater left saphenous vein.  She is followed by Dr. Cummings.   I will see her in follow-up today and have reviewed her medical record.  She has a family history of heart disease in her grandparents and hypertension runs in her parents.      She had venous thrombosis  with vascular started developing recurrent edema venous duplex of the lower extremity 2019 which showed no evidence of left lower extremity venous thromboses.  In July she began having nausea and chest tightness which got worse 40s when she arrived to her PCP and blood pressure was 142/98.  She was started on lisinopril and HCTZ.  ECG was performed.  She then had intermittent heart pounding and racing.  Thyroid panel was normal.  D-dimer was slightly elevated but CT angiogram of the chest negative for pulmonary embolism or aortic disease.  Lipid panel was unremarkable LDL 95.  24-hour Holter monitor was normal.  Patient complained of dizziness, chest pain and flushing had no correlations.  PVCs occurred rarely with ventricular couplets and bigeminy.  Echocardiogram showed normal left ventricular systolic function with an EF of 60% and no valvular disease.  Treadmill stress test showed no ECG evidence of myocardial ischemia, negative clinical evidence and findings consistent with normal ECG stress test.  She exercised for 8 minutes achieved 95% of maximum predicted heart rate exercised at 9 metabolic equivalents.  Blood pressure and heart rate had a normal response.  Her blood pressure was 100/62 so lisinopril was  "stopped and she was placed on 12.5 mg hydrochlorothiazide.  She continued to have increased intermittent palpitations which were not daily and described as a skipped beat or fast beat.  These were associated with dizziness, fatigue and near syncope.  30-day event monitor showed symptomatic unifocal PVCs with skipped beating, heart racing, shortness of breath and lightheadedness associated.  She was started on metoprolol succinate 12.5 mg daily at night.  She was to follow-up in 6 weeks.    She presents today in follow-up.  Her palpitations have improved over the last 6 weeks.  She has had only 3-4 episodes prior to starting metoprolol succinate she had these almost daily.  She is now wearing compression stockings for left lower extremity edema which has improved.  She denies chest pain tightness pressure.  Episodes of shortness of breath with exertion and occasional lightheadedness.  She is currently being treated with an antibiotic for bladder and yeast infection which she is to complete today.  She is switched jobs so her stress level has decreased.  She tells me that she was helping to care for her father-in-law and then her mother-in-law  suddenly in the last couple months.    Allergies   Allergen Reactions   • Amoxicillin Nausea And Vomiting   • Penicillins Other (See Comments)     Rash,migraine,vomiting       Past Medical History:   Diagnosis Date   • Chest pain    • Dizziness    • GERD (gastroesophageal reflux disease)    • Hypertension    • Palpitation    • PVC (premature ventricular contraction)        Past Surgical History:   Procedure Laterality Date   • ENDOSCOPY           Family and social history reviewed.     ROS  All other systems were reviewed and are negative          Objective:     Vitals:    19 1511   BP: 110/70   BP Location: Left arm   Patient Position: Sitting   Pulse: 83   Weight: 94.8 kg (209 lb)   Height: 185.4 cm (73\")     Body mass index is 27.57 kg/m².    PHYSICAL " EXAM:  Physical Exam   Constitutional: She is oriented to person, place, and time. She appears well-developed and well-nourished. No distress.   HENT:   Head: Normocephalic.   Eyes: Conjunctivae are normal.   Neck: Normal range of motion. No JVD present.   Cardiovascular: Normal rate, regular rhythm, normal heart sounds and intact distal pulses.   No murmur heard.  Pulses:       Carotid pulses are 2+ on the right side, and 2+ on the left side.       Radial pulses are 2+ on the right side, and 2+ on the left side.        Posterior tibial pulses are 2+ on the right side, and 2+ on the left side.   Pulmonary/Chest: Effort normal and breath sounds normal. No respiratory distress. She has no wheezes. She has no rhonchi. She has no rales. She exhibits no tenderness.   Abdominal: Soft. Bowel sounds are normal. She exhibits no distension.   Musculoskeletal: Normal range of motion. She exhibits no edema.   Neurological: She is alert and oriented to person, place, and time.   Skin: Skin is warm, dry and intact. No rash noted. She is not diaphoretic. No cyanosis.   Psychiatric: She has a normal mood and affect. Her behavior is normal. Judgment and thought content normal.         ECG 12 Lead  Date/Time: 11/5/2019 3:55 PM  Performed by: Millicent Pereyra APRN  Authorized by: Millicent Pereyra APRN   Comparison: compared with previous ECG   Similar to previous ECG  Rhythm: sinus rhythm  Ectopy: unifocal PVCs  Rate: normal    Clinical impression: non-specific ECG  Comments: Otherwise normal            Current Outpatient Medications   Medication Sig Dispense Refill   • aspirin 81 MG EC tablet Take 81 mg by mouth Daily.     • esomeprazole (nexIUM) 20 MG capsule Take 20 mg by mouth Daily As Needed.     • hydrochlorothiazide (MICROZIDE) 12.5 MG capsule Take 1 capsule by mouth Daily. 90 capsule 3   • levonorgestrel-ethinyl estradiol (SEASONALE) 0.15-0.03 MG per tablet Take 1 tablet by mouth Daily.     • metoprolol succinate XL (TOPROL-XL) 25  MG 24 hr tablet Take 0.5 tablets by mouth Every Night. 45 tablet 3     No current facility-administered medications for this visit.      Assessment:       Diagnosis Plan   1. PVC (premature ventricular contraction)     2. Essential hypertension          Orders Placed This Encounter   Procedures   • ECG 12 Lead     This order was created via procedure documentation         Plan:       1.   This is a 38-year-old female with symptomatic premature ventricular contractions   noted on 30-day event monitor.  Symptoms had improved on metoprolol succinate 12.5 mg nightly.  She believes she is experiencing fatigue from the beta-blocker.  Her stress level has decreased she will take 12.5 mg every other night x3 to 4 days then stop it.  If she has recurrent symptoms may need to use it on an as-needed basis.  May need to try calcium channel blocker to see if she tolerates that better  2.  Hypertension blood pressure appears stable on hydrochlorothiazide 12.5 mg daily.  As above  3.  Atypical chest pain-normal treadmill exercise stress test August 2019, normal left ventricular systolic function on echo with no valvular disease.  No complaint of chest pain today  4.  History of left lower extremity thromboses approximately 2017 with negative left lower extremity duplex June 2019  5.  Birth control use  6.  Dependent edema improved with compression socks  Follow-up in 4 months call with questions or concerns            It has been a pleasure to participate in this patient's care.      Thank you,  OSVALDO Watson      **I used Dragon to dictate this note:**

## 2019-11-25 ENCOUNTER — TELEPHONE (OUTPATIENT)
Dept: CARDIOLOGY | Facility: CLINIC | Age: 39
End: 2019-11-25

## 2019-11-25 RX ORDER — DILTIAZEM HYDROCHLORIDE 120 MG/1
120 CAPSULE, COATED, EXTENDED RELEASE ORAL NIGHTLY
Qty: 30 CAPSULE | Refills: 1 | Status: SHIPPED | OUTPATIENT
Start: 2019-11-25 | End: 2020-02-03

## 2019-11-25 NOTE — TELEPHONE ENCOUNTER
From the last OV notes 11/05/19          Plan:       1.   This is a 38-year-old female with symptomatic premature ventricular contractions   noted on 30-day event monitor.  Symptoms had improved on metoprolol succinate 12.5 mg nightly.  She believes she is experiencing fatigue from the beta-blocker.  Her stress level has decreased she will take 12.5 mg every other night x3 to 4 days then stop it.  If she has recurrent symptoms may need to use it on an as-needed basis.  May need to try calcium channel blocker to see if she tolerates that better        Pt called and states that's he tried taking Toprol XL 12.5mg every other night but it did not work. Her heart palpitations came back. She currently taking Toprol xl 12.5 mg nightly. She is still c/o fatigue.    She wants to know if she can try calcium channel blocker to see if she tolerates that better?      Thanks  Fatmata MUNGUIA

## 2019-12-30 ENCOUNTER — TELEPHONE (OUTPATIENT)
Dept: CARDIOLOGY | Facility: CLINIC | Age: 39
End: 2019-12-30

## 2019-12-30 NOTE — TELEPHONE ENCOUNTER
"12/30/19  10:06 AM  Jeanne Wei  1980  Home Phone [phone]   Work Phone [phone]       Jeanne Wei is a patient of Dr Woody, last seen by Millicent.  Patient is calling in today to let you know that she is still feeling dizzy and lightheaded.  Not feeling well over all.  Her heart rate has been 115-130 at rest.  She said she was feeling dizzy last night, like she \"may pass out.\"  She is c/o a mild headache this morning. bp ranging 140-150/109-120.  HR closer to 115 today. But it took about 2 hours to come down from 130, last night.      cardiac meds reviewed  Diltiazem 120mg nightly  Hydrochlorothiazide 12.5mg daily    Please let me know how to proceed  Thanks  Digna Del Valle RN  Triage nurse    "

## 2020-02-03 RX ORDER — DILTIAZEM HYDROCHLORIDE 120 MG/1
CAPSULE, COATED, EXTENDED RELEASE ORAL
Qty: 30 CAPSULE | Refills: 1 | Status: SHIPPED | OUTPATIENT
Start: 2020-02-03 | End: 2020-05-14

## 2020-03-19 ENCOUNTER — TELEPHONE (OUTPATIENT)
Dept: CARDIOLOGY | Facility: CLINIC | Age: 40
End: 2020-03-19

## 2020-04-16 ENCOUNTER — TELEPHONE (OUTPATIENT)
Dept: CARDIOLOGY | Facility: CLINIC | Age: 40
End: 2020-04-16

## 2020-04-16 NOTE — TELEPHONE ENCOUNTER
Patient called and left a voice mail that she would like to talk with Millicent regarding palpitations she is having.  I tried calling her back but had to leave a message. I left the message that you most likely would like her to do a video visit appt. Please advise if this is correct or if you want to call patient later.  If you wish to forward this to our  to add on that is fine.  I will just check back to be certain of your instructions./ SKYLAR    Patient's phone number is [phone]

## 2020-04-16 NOTE — TELEPHONE ENCOUNTER
Yes, this is correct. I am available for video or telephone visit tomorrow as early as 9 am or any other slot if patient agrees. I do prefer video

## 2020-04-17 ENCOUNTER — TELEMEDICINE (OUTPATIENT)
Dept: CARDIOLOGY | Facility: CLINIC | Age: 40
End: 2020-04-17

## 2020-04-17 VITALS
BODY MASS INDEX: 27.09 KG/M2 | SYSTOLIC BLOOD PRESSURE: 120 MMHG | WEIGHT: 200 LBS | HEIGHT: 72 IN | HEART RATE: 115 BPM | DIASTOLIC BLOOD PRESSURE: 95 MMHG

## 2020-04-17 DIAGNOSIS — I10 ESSENTIAL HYPERTENSION: ICD-10-CM

## 2020-04-17 DIAGNOSIS — I49.3 PVC (PREMATURE VENTRICULAR CONTRACTION): Primary | ICD-10-CM

## 2020-04-17 DIAGNOSIS — R00.0 TACHYCARDIA: ICD-10-CM

## 2020-04-17 PROCEDURE — 99214 OFFICE O/P EST MOD 30 MIN: CPT | Performed by: NURSE PRACTITIONER

## 2020-04-17 RX ORDER — IBUPROFEN 200 MG
200 TABLET ORAL EVERY 6 HOURS PRN
COMMUNITY
End: 2022-05-23

## 2020-04-17 NOTE — PROGRESS NOTES
Date of Office Visit: 20  Encounter Provider: OSVALDO Watson  Place of Service: [facility] CARDIOLOGY  Patient Name: Jeanne Wei  :1980    Chief Complaint   Patient presents with   • Shortness of Breath   :     HPI: Jeanne Wei is a 39 y.o. female  with history of palpitation, hypertension, GERD, atypical chest pain, and deep vein thrombosis in the greater left saphenous vein.    She is followed by Dr. Cummings.  I will see her in follow-up today and have reviewed her medical record.  She has a family history of heart disease in her grandparents and hypertension runs in her parents.     She had venous thrombosis in  with vascular started developing recurrent edema venous duplex of the lower extremity 2019 which showed no evidence of left lower extremity venous thromboses.  In July she began having nausea and chest tightness which got worse 40s when she arrived to her PCP and blood pressure was 142/98.  She was started on lisinopril and HCTZ.  ECG was performed.  She then had intermittent heart pounding and racing.  Thyroid panel was normal.  D-dimer was slightly elevated but CT angiogram of the chest negative for pulmonary embolism or aortic disease.  Lipid panel was unremarkable LDL 95.  24-hour Holter monitor was normal.  Patient complained of dizziness, chest pain and flushing had no correlations.  PVCs occurred rarely with ventricular couplets and bigeminy.  Echocardiogram showed normal left ventricular systolic function with an EF of 60% and no valvular disease.  Treadmill stress test showed no ECG evidence of myocardial ischemia, negative clinical evidence and findings consistent with normal ECG stress test.  She exercised for 8 minutes achieved 95% of maximum predicted heart rate exercised at 9 metabolic equivalents.  Blood pressure and heart rate had a normal response.  Her blood pressure was 100/62 so lisinopril was stopped and she was  placed on 12.5 mg hydrochlorothiazide.  She continued to have increased intermittent palpitations which were not daily and described as a skipped beat or fast beat.  These were associated with dizziness, fatigue and near syncope. 30-day event monitor showed symptomatic unifocal PVCs with skipped beating, heart racing, shortness of breath and lightheadedness associated.  She was started on metoprolol succinate 12.5 mg daily at night.  Her palpitations improved. She began wearing compression socks which hellped lower extremity swelling. She had switched jobs by 2019 and stress level had improved. She was helping to care for her father-in-law and then her mother-in-law  suddenly. He had nsome new fatigue and was concerned it was the metoprolol so she was to taper down and try using it on an as needed base. She had increased palpitations and was started on diltiazem 120 mg daily.     We visit today via video to reassess increased palpitations that began earlier this week. She also had a headache that got better with excedrin. Her blood pressure has been 98/72 , 91/70 HR 86, 133/92 . She has occasional dizziness and shortness of breath. She has shortness of breath even at rest. That doesn't get worse with exertion. Her face gets flushed. These episodes seem to correlate also with palpitations. No chest pain. She had improved fatigue off metoprolol only mild fatigue on diltiazem. She denies any new stressors. No increased stimulants. No near syncope or syncope.    Allergies   Allergen Reactions   • Amoxicillin Nausea And Vomiting   • Penicillins Other (See Comments)     Rash,migraine,vomiting       Past Medical History:   Diagnosis Date   • Chest pain    • Dizziness    • GERD (gastroesophageal reflux disease)    • Hypertension    • Palpitation    • PVC (premature ventricular contraction)      Past Surgical History:   Procedure Laterality Date   • ENDOSCOPY       Family and social history reviewed.  "    Review of Systems   Constitution: Positive for fever (99.8) and malaise/fatigue. Negative for chills.   Cardiovascular: Positive for palpitations.   Respiratory: Positive for snoring. Negative for cough.    Gastrointestinal: Negative for abdominal pain.   Neurological: Positive for headaches.     All other systems were reviewed and are negative          Objective:     Vitals:    04/17/20 1125   BP: 120/95   Pulse: 115   Weight: 90.7 kg (200 lb)   Height: 185.4 cm (73\")     Body mass index is 26.39 kg/m².    PHYSICAL EXAM:  Physical Exam  Patient is well developed, A & O x4, memory intact respirations normal rate, non labored, PERRLA, skin color pink    Procedures  Unable to assess  Current Outpatient Medications   Medication Sig Dispense Refill   • aspirin 81 MG EC tablet Take 81 mg by mouth Daily.     • Aspirin-Acetaminophen-Caffeine (EXCEDRIN PO) Take  by mouth As Needed.     • dilTIAZem CD (CARDIZEM CD) 120 MG 24 hr capsule TAKE ONE CAPSULE BY MOUTH EVERY EVENING 30 capsule 1   • esomeprazole (nexIUM) 20 MG capsule Take 20 mg by mouth Daily As Needed.     • hydrochlorothiazide (MICROZIDE) 12.5 MG capsule Take 1 capsule by mouth Daily. 90 capsule 3   • ibuprofen (ADVIL,MOTRIN) 200 MG tablet Take 200 mg by mouth Every 6 (Six) Hours As Needed for Mild Pain .     • levonorgestrel-ethinyl estradiol (SEASONALE) 0.15-0.03 MG per tablet Take 1 tablet by mouth Daily.       No current facility-administered medications for this visit.      Assessment:       Diagnosis Plan   1. PVC (premature ventricular contraction)     2. Essential hypertension     3. Tachycardia          No orders of the defined types were placed in this encounter.        Plan:     1.   This is a 39-year-old female with symptomatic premature ventricular contractions   noted on 30-day event monitor.  Symptoms had improved on metoprolol succinate 12.5 mg but that was stopped due to fatigue. She seems to tolerate diltiazem 120 mg daily. Her blood " pressure is occasionally on the low side. SHe is to add daily OTC magnesium with hopes for symptom improvement for symptomatic PVCs.  2.  Hypertension blood pressure appears stable continue the same  3.  Atypical chest pain-normal treadmill exercise stress test August 2019, normal left ventricular systolic function on echo with no valvular disease.  No complaint of chest pain today  4.  History of left lower extremity thromboses approximately 2017 with negative left lower extremity duplex June 2019  5.  Birth control use  6.  Dependent edema improved with compression socks  7. Shortness of breath associated with palpitations-we will see if this improves     This patient has consented to a telehealth visit via video. The visit was scheduled as a video visit to comply with patient safety concerns in accordance with CDC recommendations.  All vitals recorded within this visit are reported by the patient.  I spent   30 minutes in total including but not limited to the 18 minutes spent in direct conversation with this patient.     Follow up in  2 weeks via video chat. She is to call with questions or concerns.            It has been a pleasure to participate in this patient's care.      Thank you,  OSVALDO Watson

## 2020-05-01 ENCOUNTER — TELEMEDICINE (OUTPATIENT)
Dept: CARDIOLOGY | Facility: CLINIC | Age: 40
End: 2020-05-01

## 2020-05-01 VITALS
HEIGHT: 72 IN | WEIGHT: 205 LBS | SYSTOLIC BLOOD PRESSURE: 104 MMHG | DIASTOLIC BLOOD PRESSURE: 73 MMHG | BODY MASS INDEX: 27.77 KG/M2 | HEART RATE: 119 BPM

## 2020-05-01 DIAGNOSIS — I10 ESSENTIAL HYPERTENSION: ICD-10-CM

## 2020-05-01 DIAGNOSIS — I49.3 PVC (PREMATURE VENTRICULAR CONTRACTION): ICD-10-CM

## 2020-05-01 DIAGNOSIS — I49.3 SYMPTOMATIC PVCS: Primary | ICD-10-CM

## 2020-05-01 DIAGNOSIS — R00.0 TACHYCARDIA: ICD-10-CM

## 2020-05-01 DIAGNOSIS — R00.2 INTERMITTENT PALPITATIONS: ICD-10-CM

## 2020-05-01 PROCEDURE — 99214 OFFICE O/P EST MOD 30 MIN: CPT | Performed by: NURSE PRACTITIONER

## 2020-05-01 NOTE — PROGRESS NOTES
Date of Office Visit: 20  Encounter Provider: OSVALDO Watson  Place of Service: [facility] CARDIOLOGY  Patient Name: Jeanne Wei  :1980    Chief Complaint   Patient presents with   • Palpitations     PVCs   :     HPI: Jeanne Wei is a 39 y.o. female  with history of palpitation, hypertension, GERD, atypical chest pain, and deep vein thrombosis in the greater left saphenous vein.      She is followed by Dr. Cummings.  I will see her in follow-up today and have reviewed her medical record.  She has a family history of heart disease in her grandparents and hypertension runs in her parents.    In her teenage years she had a near passing out episode was evaluated by cardiology and a procedure was recommended but she does not recall what that was or who she saw.     She had venous thrombosis in  with vascular started developing recurrent edema venous duplex of the lower extremity 2019 which showed no evidence of left lower extremity venous thromboses.  In July she began having nausea and chest tightness which got worse 40s when she arrived to her PCP and blood pressure was 142/98.  She was started on lisinopril and HCTZ.  ECG was performed.  She then had intermittent heart pounding and racing.  Thyroid panel was normal.  D-dimer was slightly elevated but CT angiogram of the chest negative for pulmonary embolism or aortic disease.  Lipid panel was unremarkable LDL 95.  24-hour Holter monitor was normal.  Patient complained of dizziness, chest pain and flushing had no correlations.  PVCs occurred rarely with ventricular couplets and bigeminy.  Echocardiogram showed normal left ventricular systolic function with an EF of 60% and no valvular disease.  Treadmill stress test showed no ECG evidence of myocardial ischemia, negative clinical evidence and findings consistent with normal ECG stress test.  She exercised for 8 minutes achieved 95% of maximum  predicted heart rate exercised at 9 metabolic equivalents.  Blood pressure and heart rate had a normal response.  Her blood pressure was 100/62 so lisinopril was stopped and she was placed on 12.5 mg hydrochlorothiazide.  She continued to have increased intermittent palpitations which were not daily and described as a skipped beat or fast beat.  These were associated with dizziness, fatigue and near syncope. 30-day event monitor showed symptomatic unifocal PVCs with skipped beating, heart racing, shortness of breath and lightheadedness associated.  She was started on metoprolol succinate 12.5 mg daily at night.  Her palpitations improved. She began wearing compression socks which hellped lower extremity swelling. She had switched jobs by 2019 and stress level had improved. She was helping to care for her father-in-law and then her mother-in-law  suddenly. She then had some new fatigue and was concerned it was the metoprolol so she was to taper down and try using it on an as needed base. fatigue improved but She had increased palpitations and was started on diltiazem 120 mg daily.      She continued to have spells of symptomatic PVCs. She started over the counter magnesium supplementation once daily.    We visit today via video. She continues to complain of shortness of breath and chest pressure with palpitations. The magnesium has not really helped. She has no new swelling, some occasional lightheadedness but no near syncope or syncope. She has both good and bad days with palpitations and associated symptoms. She had repeat blood work by her PCP last week.        Allergies   Allergen Reactions   • Amoxicillin Nausea And Vomiting   • Penicillins Other (See Comments)     Rash,migraine,vomiting       Past Medical History:   Diagnosis Date   • Chest pain    • Dizziness    • GERD (gastroesophageal reflux disease)    • Hypertension    • Palpitation    • PVC (premature ventricular contraction)        Past Surgical  "History:   Procedure Laterality Date   • ENDOSCOPY  2006         Family and social history reviewed.     ROS  All other systems were reviewed and are negative          Objective:     Vitals:    05/01/20 1332   BP: 104/73   Pulse: 119   Weight: 93 kg (205 lb)   Height: 185.4 cm (73\")     Body mass index is 27.05 kg/m².    PHYSICAL EXAM:  Physical Exam   Patient is well developed, A & O x4, memory intact respirations normal rate, non labored, PERRLA, skin color pink    Procedures  Unable to assess  Current Outpatient Medications   Medication Sig Dispense Refill   • aspirin 81 MG EC tablet Take 81 mg by mouth Daily.     • Aspirin-Acetaminophen-Caffeine (EXCEDRIN PO) Take  by mouth As Needed.     • dilTIAZem CD (CARDIZEM CD) 120 MG 24 hr capsule TAKE ONE CAPSULE BY MOUTH EVERY EVENING 30 capsule 1   • esomeprazole (nexIUM) 20 MG capsule Take 20 mg by mouth Daily As Needed.     • Fexofenadine HCl (MUCINEX ALLERGY PO) Take  by mouth As Needed.     • hydrochlorothiazide (MICROZIDE) 12.5 MG capsule Take 1 capsule by mouth Daily. 90 capsule 3   • ibuprofen (ADVIL,MOTRIN) 200 MG tablet Take 200 mg by mouth Every 6 (Six) Hours As Needed for Mild Pain .     • levonorgestrel-ethinyl estradiol (SEASONALE) 0.15-0.03 MG per tablet Take 1 tablet by mouth Daily.       No current facility-administered medications for this visit.      Assessment:       Diagnosis Plan   1. Symptomatic PVCs  Ambulatory Referral to Cardiac Electrophysiology   2. PVC (premature ventricular contraction)     3. Essential hypertension     4. Intermittent palpitations     5. Tachycardia          Orders Placed This Encounter   Procedures   • Ambulatory Referral to Cardiac Electrophysiology     Referral Priority:   Routine     Referral Type:   Consultation     Referral Reason:   Specialty Services Required     Requested Specialty:   Cardiac Electrophysiology     Number of Visits Requested:   1         Plan:        1.   This is a 39-year-old female with " symptomatic premature ventricular contractions   noted on 30-day event monitor.  Symptoms had improved on metoprolol succinate 12.5 mg but that was stopped due to fatigue. She seems to tolerate diltiazem 120 mg daily. Her blood pressure is occasionally on the low side.   Now on Magnesium OTC twice daily but continues to have symptoms.   - I am referring her to EP for evaluation, further management and to see if she is a possible ablation candidate. She is agreeable  - will contact her PCP for copy of her most recent labs  2.  Hypertension blood pressure appears stable continue the same  3.  Atypical chest pain-normal treadmill exercise stress test August 2019, normal left ventricular systolic function on echo with no valvular disease.    4.  History of left lower extremity thromboses approximately 2017 with negative left lower extremity duplex June 2019  5.  Birth control use  6.  Dependent edema improved with compression socks      This patient has consented to a telehealth visit via video. The visit was scheduled as a video visit to comply with patient safety concerns in accordance with CDC recommendations.  All vitals recorded within this visit are reported by the patient.  I spent  25 minutes in total including but not limited to the 14 minutes spent in direct conversation with this patient.       Follow up in two weeks repeat video            It has been a pleasure to participate in this patient's care.      Thank you,  OSVALDO Watson      **I used Dragon to dictate this note:**

## 2020-05-04 ENCOUNTER — TELEPHONE (OUTPATIENT)
Dept: CARDIOLOGY | Facility: CLINIC | Age: 40
End: 2020-05-04

## 2020-05-08 DIAGNOSIS — I49.3 PVC (PREMATURE VENTRICULAR CONTRACTION): Primary | ICD-10-CM

## 2020-05-14 ENCOUNTER — TELEMEDICINE (OUTPATIENT)
Dept: CARDIOLOGY | Facility: CLINIC | Age: 40
End: 2020-05-14

## 2020-05-14 VITALS
BODY MASS INDEX: 28.44 KG/M2 | HEART RATE: 119 BPM | WEIGHT: 210 LBS | SYSTOLIC BLOOD PRESSURE: 129 MMHG | HEIGHT: 72 IN | DIASTOLIC BLOOD PRESSURE: 82 MMHG

## 2020-05-14 DIAGNOSIS — I10 ESSENTIAL HYPERTENSION: ICD-10-CM

## 2020-05-14 DIAGNOSIS — I49.3 SYMPTOMATIC PVCS: ICD-10-CM

## 2020-05-14 DIAGNOSIS — I49.3 PVC (PREMATURE VENTRICULAR CONTRACTION): Primary | ICD-10-CM

## 2020-05-14 DIAGNOSIS — R00.2 INTERMITTENT PALPITATIONS: ICD-10-CM

## 2020-05-14 DIAGNOSIS — R00.0 TACHYCARDIA: ICD-10-CM

## 2020-05-14 PROCEDURE — 99214 OFFICE O/P EST MOD 30 MIN: CPT | Performed by: NURSE PRACTITIONER

## 2020-05-14 RX ORDER — AZITHROMYCIN 1 G
250 PACKET (EA) ORAL TAKE AS DIRECTED
COMMUNITY
End: 2020-07-01

## 2020-05-14 RX ORDER — DILTIAZEM HYDROCHLORIDE 180 MG/1
180 CAPSULE, EXTENDED RELEASE ORAL DAILY
Qty: 30 CAPSULE | Refills: 1 | Status: SHIPPED | OUTPATIENT
Start: 2020-05-14 | End: 2020-07-01

## 2020-05-14 NOTE — PROGRESS NOTES
Date of Office Visit: 20  Encounter Provider: OSVALDO Watson  Place of Service: [facility] CARDIOLOGY  Patient Name: Jeanne Wei  :1980    Chief Complaint   Patient presents with   • Palpitations     pt has Zio patch   • Shortness of Breath   • Chest Pain     pressure since April and worse last week   • Fatigued   • Leg Swelling     known venous insuffiency   :     HPI: Jeanne Wei is a 39 y.o. female  with history of  palpitation,symtomatic premature ventricular contraction, hypertension, GERD, atypical chest pain, and deep vein thrombosis in the greater left saphenous vein.       She is followed by Dr. Cummings.  I will see her in follow-up today and have reviewed her medical record.  She has a family history of heart disease in her grandparents and hypertension runs in her parents.     In her teenage years she had a near passing out episode was evaluated by cardiology and a procedure was recommended but she does not recall what that was or who she saw.     She had venous thrombosis in  with vascular started developing recurrent edema venous duplex of the lower extremity 2019 which showed no evidence of left lower extremity venous thromboses.  In July she began having nausea and chest tightness which got worse 40s when she arrived to her PCP and blood pressure was 142/98.  She was started on lisinopril and HCTZ.  ECG was performed.  She then had intermittent heart pounding and racing.  Thyroid panel was normal.  D-dimer was slightly elevated but CT angiogram of the chest negative for pulmonary embolism or aortic disease.  Lipid panel was unremarkable LDL 95.  24-hour Holter monitor was normal.  Patient complained of dizziness, chest pain and flushing had no correlations.  PVCs occurred rarely with ventricular couplets and bigeminy.  Echocardiogram showed normal left ventricular systolic function with an EF of 60% and no valvular disease.  " Treadmill stress test showed no ECG evidence of myocardial ischemia, negative clinical evidence and findings consistent with normal ECG stress test.  She exercised for 8 minutes achieved 95% of maximum predicted heart rate exercised at 9 metabolic equivalents.  Blood pressure and heart rate had a normal response.  Her blood pressure was 100/62 so lisinopril was stopped and she was placed on 12.5 mg hydrochlorothiazide.  She continued to have increased intermittent palpitations which were not daily and described as a skipped beat or fast beat.  These were associated with dizziness, fatigue and near syncope. 30-day event monitor showed symptomatic unifocal PVCs with skipped beating, heart racing, shortness of breath and lightheadedness associated.  She was started on metoprolol succinate 12.5 mg daily at night.  Her palpitations improved. She began wearing compression socks which hellped lower extremity swelling. She had switched jobs by 2019 and stress level had improved. She was helping to care for her father-in-law and then her mother-in-law  suddenly. She then had some new fatigue and was concerned it was the metoprolol so she was to taper down and try using it on an as needed base. fatigue improved but She had increased palpitations and was started on diltiazem 120 mg daily.      She continued to have spells of symptomatic PVCs.  Her blood pressure was low normal so she started over the counter magnesium supplementation and was referred to electrophysiology who ordered a ZIO patch    We visit today via video. She is currently wearing a zio patch. Her lowest blood pressure value was 98/70. All systolic values have been below 120. She had a good day yesterday but most days shes had a heart rate above 100 and palpitations described as extra beats \" non stop\". She has not had any relief since trying OTC magnesium.          Allergies   Allergen Reactions   • Amoxicillin Nausea And Vomiting   • Penicillins " "Other (See Comments)     Rash,migraine,vomiting       Past Medical History:   Diagnosis Date   • Chest pain    • Dizziness    • GERD (gastroesophageal reflux disease)    • Hypertension    • Palpitation    • PVC (premature ventricular contraction)        Past Surgical History:   Procedure Laterality Date   • ENDOSCOPY  2006         Family and social history reviewed.     Review of Systems   Constitution: Positive for malaise/fatigue.   Cardiovascular: Positive for chest pain and palpitations. Negative for syncope.   Respiratory: Positive for shortness of breath.    Neurological: Positive for light-headedness.     All other systems were reviewed and are negative          Objective:     Vitals:    05/14/20 1416   BP: 129/82   Pulse: 119   Weight: 95.3 kg (210 lb)   Height: 185.4 cm (73\")     Body mass index is 27.71 kg/m².    PHYSICAL EXAM:  Physical Exam  Patient is well developed, A & O x4, memory intact respirations normal rate, non labored, PERRLA, skin color pink    Procedures  Unable to assess   Current Outpatient Medications   Medication Sig Dispense Refill   • aspirin 81 MG EC tablet Take 81 mg by mouth Daily.     • Aspirin-Acetaminophen-Caffeine (EXCEDRIN PO) Take  by mouth As Needed.     • azithromycin (ZITHROMAX) 1 g powder Take 250 mg by mouth Take As Directed.     • esomeprazole (nexIUM) 20 MG capsule Take 20 mg by mouth Daily As Needed.     • Fexofenadine HCl (MUCINEX ALLERGY PO) Take  by mouth As Needed.     • hydrochlorothiazide (MICROZIDE) 12.5 MG capsule Take 1 capsule by mouth Daily. 90 capsule 3   • ibuprofen (ADVIL,MOTRIN) 200 MG tablet Take 200 mg by mouth Every 6 (Six) Hours As Needed for Mild Pain .     • levonorgestrel-ethinyl estradiol (SEASONALE) 0.15-0.03 MG per tablet Take 1 tablet by mouth Daily.     • dilTIAZem XR (DILACOR XR) 180 MG 24 hr capsule Take 1 capsule by mouth Daily. 30 capsule 1     No current facility-administered medications for this visit.      Assessment:      No diagnosis " found.     No orders of the defined types were placed in this encounter.        Plan:      1.   This is a 39-year-old female with symptomatic premature ventricular contractions   noted on 30-day event monitor.  Symptoms had improved on metoprolol succinate 12.5 mg but that was stopped due to fatigue. She seems to tolerate diltiazem 120 mg daily. No relief from magnesium OTC so we will stop that and increase diltiazem from 120 to 180 mg daily. Her blood pressure is occasionally on the low side. She will follow bp closely and call me with issues.  - Previously referred her to DEJ[facility] QUIROGA    2.  Hypertension blood pressure appears stable   3.  Atypical chest pain-normal treadmill exercise stress test August 2019, normal left ventricular systolic function on echo with no valvular disease.    4.  History of left lower extremity thromboses approximately 2017 with negative left lower extremity duplex June 2019  5.  Birth control use  6.  Dependent edema improved with compression socks      This patient has consented to a telehealth visit via video. The visit was scheduled as a video visit to comply with patient safety concerns in accordance with CDC recommendations.  All vitals recorded within this visit are reported by the patient.  I spent 20 minutes in total including but not limited to the 10 minutes spent in direct conversation with this patient.         It has been a pleasure to participate in this patient's care.      Thank you,  OSVALDO Watson      **I used Dragon to dictate this note:**

## 2020-06-12 ENCOUNTER — OFFICE VISIT (OUTPATIENT)
Dept: CARDIOLOGY | Facility: CLINIC | Age: 40
End: 2020-06-12

## 2020-06-12 VITALS
WEIGHT: 218 LBS | HEART RATE: 93 BPM | HEIGHT: 72 IN | DIASTOLIC BLOOD PRESSURE: 84 MMHG | BODY MASS INDEX: 29.53 KG/M2 | SYSTOLIC BLOOD PRESSURE: 120 MMHG

## 2020-06-12 DIAGNOSIS — I10 ESSENTIAL HYPERTENSION: ICD-10-CM

## 2020-06-12 DIAGNOSIS — I49.3 UNIFOCAL PVCS: Primary | ICD-10-CM

## 2020-06-12 PROCEDURE — 99204 OFFICE O/P NEW MOD 45 MIN: CPT | Performed by: INTERNAL MEDICINE

## 2020-06-12 PROCEDURE — 93000 ELECTROCARDIOGRAM COMPLETE: CPT | Performed by: INTERNAL MEDICINE

## 2020-06-12 NOTE — PROGRESS NOTES
Date of Office Visit: 2020  Encounter Provider: Duarte Rodriguez MD  Place of Service: [facility] CARDIOLOGY  Patient Name: Jeanne Wei  : 1980    Subjective:     Encounter Date:2020      Patient ID: Jeanne Wei is a 39 y.o. female who has a cc referred by AL for eval of PVCs.     ECHO in 2019 -- normal     She works at [employer] in [place].     She originally presented with chest pressure. Felt like her heart rate was high. Her sister is Lennie Gray -- the ECG showed PVCs.     Zio showed PVCs 15%    She has changed jobs and has less stress and still has PVCs.     She feels skips in her chest then after a skip then it goes faster. She feels it more when she is quiet but she can get during exercise. Seems more freq -- like it doesn't stop.     She is active physically and she thinks there is more fatigue.         There have been no hospital admission since the last visit.     There have been no bleeding events.       Past Medical History:   Diagnosis Date   • Chest pain    • Dizziness    • GERD (gastroesophageal reflux disease)    • Hypertension    • Palpitation    • PVC (premature ventricular contraction)        Social History     Socioeconomic History   • Marital status:      Spouse name: Not on file   • Number of children: Not on file   • Years of education: Not on file   • Highest education level: Not on file   Tobacco Use   • Smoking status: Never Smoker   • Smokeless tobacco: Never Used   • Tobacco comment: caffeine on occas.    Substance and Sexual Activity   • Alcohol use: No   • Drug use: Defer   • Sexual activity: Defer   Lifestyle   • Physical activity:     Days per week: 3 days     Minutes per session: 20 min   • Stress: Not at all       Review of Systems   Constitution: Negative for fever and night sweats.   HENT: Negative for ear pain and stridor.    Eyes: Negative for discharge and visual halos.   Cardiovascular: Negative for  "cyanosis.   Respiratory: Negative for hemoptysis and sputum production.    Hematologic/Lymphatic: Negative for adenopathy.   Skin: Negative for nail changes and unusual hair distribution.   Musculoskeletal: Negative for gout and joint swelling.   Gastrointestinal: Negative for bowel incontinence and flatus.   Genitourinary: Negative for dysuria and flank pain.   Neurological: Negative for seizures and tremors.   Psychiatric/Behavioral: Negative for altered mental status. The patient is not nervous/anxious.             Objective:     Vitals:    06/12/20 1025   BP: 120/84   BP Location: Right arm   Patient Position: Sitting   Cuff Size: Large Adult   Pulse: 93   Weight: 98.9 kg (218 lb)   Height: 185.4 cm (73\")         Physical Exam   Constitutional: She is oriented to person, place, and time.   HENT:   Head: Normocephalic and atraumatic.   Eyes: Right eye exhibits no discharge. Left eye exhibits no discharge.   Neck: No JVD present. No thyromegaly present.   Cardiovascular: Normal rate and regular rhythm. Exam reveals no gallop and no friction rub.   No murmur heard.  Pulmonary/Chest: Effort normal and breath sounds normal. She has no rales.   Abdominal: Soft. Bowel sounds are normal. There is no tenderness.   Musculoskeletal: Normal range of motion. She exhibits no edema or deformity.   Neurological: She is alert and oriented to person, place, and time. She exhibits normal muscle tone.   Skin: Skin is warm and dry. No erythema.   Psychiatric: She has a normal mood and affect. Her behavior is normal. Thought content normal.         ECG 12 Lead  Date/Time: 6/12/2020 10:58 AM  Performed by: Duarte Rodriguez MD  Authorized by: Duarte Rodriguez MD   Comparison: compared with previous ECG   Similar to previous ECG  Rhythm: sinus rhythm  Ectopy: unifocal PVCs            Lab Review:       Assessment:          Diagnosis Plan   1. Unifocal PVCs     2. Essential hypertension            Plan:     She has symptomatic PVCs          "    She only tried metoprolol and had fatigue and was switched to diltiazem which does not cause side effects but does suppress symptoms nor PVCs.     Here is what we came with:    I reassured that these were benign -- no structural heart disease and well less than 20k per day     The best rx is nothing other than good exercise and diet and sleep.    Dilt is working -- we should stop it     If this educational session does not alleviate her symptoms to her satisfaction. Then we can try a diff beta-blocker or even an AAD>

## 2020-08-11 ENCOUNTER — TELEPHONE (OUTPATIENT)
Dept: CARDIOLOGY | Facility: CLINIC | Age: 40
End: 2020-08-11

## 2020-08-11 RX ORDER — ATENOLOL 25 MG/1
12.5 TABLET ORAL 2 TIMES DAILY
Qty: 30 TABLET | Refills: 3 | Status: SHIPPED | OUTPATIENT
Start: 2020-08-11 | End: 2020-10-05

## 2020-08-11 NOTE — TELEPHONE ENCOUNTER
Pt called with cc of increased problems with palps. In her LOV you stopped her diltiazem and discussed trying a different BB if this happened. What do you recommend she try pt does not tolerate metoprolol well...Ирина

## 2020-10-05 ENCOUNTER — OFFICE VISIT (OUTPATIENT)
Dept: CARDIOLOGY | Facility: CLINIC | Age: 40
End: 2020-10-05

## 2020-10-05 VITALS
HEIGHT: 72 IN | SYSTOLIC BLOOD PRESSURE: 128 MMHG | BODY MASS INDEX: 29.69 KG/M2 | HEART RATE: 88 BPM | DIASTOLIC BLOOD PRESSURE: 78 MMHG | WEIGHT: 219.2 LBS

## 2020-10-05 DIAGNOSIS — I49.3 UNIFOCAL PVCS: ICD-10-CM

## 2020-10-05 DIAGNOSIS — R00.2 PALPITATIONS: ICD-10-CM

## 2020-10-05 DIAGNOSIS — R42 DIZZINESS: ICD-10-CM

## 2020-10-05 PROCEDURE — 93000 ELECTROCARDIOGRAM COMPLETE: CPT | Performed by: NURSE PRACTITIONER

## 2020-10-05 PROCEDURE — 99214 OFFICE O/P EST MOD 30 MIN: CPT | Performed by: NURSE PRACTITIONER

## 2020-10-05 NOTE — PROGRESS NOTES
Date of Office Visit: 10/05/2020  Encounter Provider: OSVALDO Harper  Place of Service: [facility] CARDIOLOGY  Patient Name: Jeanne Wei  :1980    Chief Complaint   Patient presents with   • PVC'S     3 month f/u    • Hypertension   :     HPI: Jeanne Wei is a 39 y.o. female who was referred to Dr. Rodriguez for PVC's---she saw him in ---she was on diltiazem which was not suppressing PVC's. She had a normal echo and treadmill stress test in 2019. Zio (11 days) which showed 15% PVC's. She tried low dose atenolol but made her feel dizzy and lightheaded and had a syncopal episode on . She stopped the atenolol.     The syncopal episode on ---she had just gotten out of the shower (she says she does take hot showers)---she felt dizzy and was trying to make it too the bed to lay down and she passed out briefly---she says seconds---she fell/sat on her butt--she did not injure herself.    She continues to have feelings of palpitations and intermittent feelings of her heart racing for brief periods. She has feelings of dizziness and sometimes feels short of breath. She is unaware of any particular triggers but does say she has a lot of trouble with her menstrual cycles.     She has been trying to exercise---she is walking 3 miles, 3-4 times per week---not at a fast pace.     She has lost about 5 lbs.     She has really cleaned up her diet---doing more low carb and she only drinks about 1 diet coke per day---the rest of the day water and has very little junk food.         Past Medical History:   Diagnosis Date   • Chest pain    • Dizziness    • GERD (gastroesophageal reflux disease)    • Hypertension    • Palpitation    • PVC (premature ventricular contraction)    • Tachycardia    • Unifocal PVCs        Past Surgical History:   Procedure Laterality Date   • ENDOSCOPY         Social History     Socioeconomic History   • Marital status:      Spouse  name: Not on file   • Number of children: Not on file   • Years of education: Not on file   • Highest education level: Not on file   Tobacco Use   • Smoking status: Never Smoker   • Smokeless tobacco: Never Used   • Tobacco comment: caffeine on occas.    Substance and Sexual Activity   • Alcohol use: No   • Drug use: Defer   • Sexual activity: Defer   Lifestyle   • Physical activity     Days per week: 3 days     Minutes per session: 20 min   • Stress: Not at all       Family History   Problem Relation Age of Onset   • Sarcoidosis Mother    • Hypertension Mother    • Hypertension Father    • Heart disease Maternal Grandmother    • Stroke Maternal Grandmother    • Heart disease Maternal Grandfather    • Diabetes Maternal Grandfather    • Stroke Maternal Grandfather    • Heart disease Paternal Grandmother    • Heart disease Paternal Grandfather    • Diabetes Paternal Grandfather        Review of Systems   Constitution: Negative for chills, fever and malaise/fatigue.   Cardiovascular: Positive for palpitations. Negative for chest pain, dyspnea on exertion, leg swelling, near-syncope, orthopnea, paroxysmal nocturnal dyspnea and syncope.   Respiratory: Positive for shortness of breath. Negative for cough.    Musculoskeletal: Negative for joint pain, joint swelling and myalgias.   Gastrointestinal: Negative for abdominal pain, diarrhea, melena, nausea and vomiting.   Genitourinary: Negative for frequency and hematuria.   Neurological: Positive for dizziness and light-headedness. Negative for numbness, paresthesias and seizures.   Allergic/Immunologic: Negative.    All other systems reviewed and are negative.      Allergies   Allergen Reactions   • Amoxicillin Nausea And Vomiting   • Penicillins Other (See Comments)     Rash,migraine,vomiting         Current Outpatient Medications:   •  aspirin 81 MG EC tablet, Take 81 mg by mouth Daily., Disp: , Rfl:   •  Aspirin-Acetaminophen-Caffeine (EXCEDRIN PO), Take  by mouth As  "Needed., Disp: , Rfl:   •  esomeprazole (nexIUM) 20 MG capsule, Take 20 mg by mouth Daily As Needed., Disp: , Rfl:   •  Fexofenadine HCl (MUCINEX ALLERGY PO), Take  by mouth As Needed., Disp: , Rfl:   •  ibuprofen (ADVIL,MOTRIN) 200 MG tablet, Take 200 mg by mouth Every 6 (Six) Hours As Needed for Mild Pain ., Disp: , Rfl:   •  levonorgestrel-ethinyl estradiol (SEASONALE) 0.15-0.03 MG per tablet, Take 1 tablet by mouth Daily., Disp: , Rfl:       Objective:     Vitals:    10/05/20 0955   BP: 128/78   BP Location: Left arm   Patient Position: Sitting   Pulse: 88   Weight: 99.4 kg (219 lb 3.2 oz)   Height: 185.4 cm (73\")     Body mass index is 28.92 kg/m².    PHYSICAL EXAM:    Vitals Reviewed.   General Appearance: No acute distress, well developed and well nourished.   Eyes: Conjunctiva and lids: No erythema, swelling, or discharge. Sclera non-icteric.   HENT: Atraumatic, normocephalic. External eyes, ears, and nose normal.   Respiratory: No signs of respiratory distress. Respiration rhythm and depth normal.   Clear to auscultation. No rales, crackles, rhonchi, or wheezing auscultated.   Cardiovascular:  Jugular Venous Pressure: Normal  Heart Rate and Rhythm: Regularly, irregular. Heart Sounds: Normal S1 and S2. No S3 or S4 noted.  Murmurs: No murmurs noted. No rubs, thrills, or gallops.   Arterial Pulses:  Posterior tibialis and dorsalis pedis pulses normal.   Lower Extremities: No edema noted.  Gastrointestinal:  Abdomen soft, non-distended, non-tender.   Musculoskeletal: Normal movement of extremities  Skin and Nails: General appearance normal. No pallor, cyanosis, diaphoresis. Skin temperature normal. No clubbing of fingernails.   Psychiatric: Patient alert and oriented to person, place, and time. Speech and behavior appropriate. Normal mood and affect.       ECG 12 Lead    Date/Time: 10/5/2020 10:08 AM  Performed by: Guillermina Fox APRN  Authorized by: Guillermina Fox APRN   Comparison: compared with previous " ECG   Similar to previous ECG  Rhythm: sinus rhythm  Ectopy: unifocal PVCs  BPM: 88                Assessment:       Diagnosis Plan   1. Unifocal PVCs  ECG 12 Lead    Holter Monitor - 24 Hour   2. Palpitations     3. Dizziness            Plan:       1.-3. Unifocal PVC's on EKG today, she fees the PVC's---Zio in May showed 15% burden for the 11 days---she was intolerant to atenolol---and diltiazem did not work. Reassured her that these were benign with normal echo and treadmill last year and sometimes meds are worse that the symptoms---I am going to check 24hr Holter to see if burden has changed---will review results ---if she is having a lot then can consider AAD.     I will call her with results when available.     As always, it has been a pleasure to participate in your patient's care.      Sincerely,         OSVALDO Barreto

## 2020-10-09 ENCOUNTER — TELEPHONE (OUTPATIENT)
Dept: CARDIOLOGY | Facility: CLINIC | Age: 40
End: 2020-10-09

## 2020-10-09 RX ORDER — FLECAINIDE ACETATE 50 MG/1
50 TABLET ORAL 2 TIMES DAILY
Qty: 60 TABLET | Refills: 11 | Status: SHIPPED | OUTPATIENT
Start: 2020-10-09 | End: 2021-10-26

## 2020-10-09 NOTE — TELEPHONE ENCOUNTER
Called and spoke with her regarding monitor results---discussed with Dr. Rodriguez     Will try a little low dose flecainide    She is going start it on Monday     Will send a message to scheduling to get her in for EKG only end of next week.

## 2021-02-23 ENCOUNTER — TELEPHONE (OUTPATIENT)
Dept: CARDIOLOGY | Facility: CLINIC | Age: 41
End: 2021-02-23

## 2021-02-23 NOTE — TELEPHONE ENCOUNTER
"Rico Sarmiento is calling in to let you know that she has not been feeling well since Friday.  She is reporting visual changes (seeing spots)  This started Friday and she is still noticing a little of it today.  She is dizzy and SOA.  Saturday and sunday she experienced left side \"sharp\" chest pain that didn't radiate, which has sense resolved.  She is also feeling fatigued.    She said her bp is higher than usual.  Saturday 147/93 hr 53  Today 122/96   She is reporting that her heart rate is ranging     please let me know how you would like to proceed  Thanks  Digna Del Valle RN  Triage nurse    "

## 2021-02-23 NOTE — TELEPHONE ENCOUNTER
Pt notified and verbalized understanding. She will call her PCP  Thanks  Digna Del Valle RN  Triage nurse

## 2021-02-23 NOTE — TELEPHONE ENCOUNTER
I don't think these symptoms are cardiac related--- I would recommend follow up with PCP or if symptoms are persistent and she continues to have visual changes then she should go to the ED for evaluation.

## 2021-10-26 RX ORDER — FLECAINIDE ACETATE 50 MG/1
TABLET ORAL
Qty: 60 TABLET | Refills: 0 | Status: SHIPPED | OUTPATIENT
Start: 2021-10-26 | End: 2021-12-06 | Stop reason: SDUPTHER

## 2021-11-29 RX ORDER — FLECAINIDE ACETATE 50 MG/1
TABLET ORAL
Qty: 60 TABLET | Refills: 0 | OUTPATIENT
Start: 2021-11-29

## 2021-12-06 ENCOUNTER — TELEPHONE (OUTPATIENT)
Dept: CARDIOLOGY | Facility: CLINIC | Age: 41
End: 2021-12-06

## 2021-12-06 RX ORDER — FLECAINIDE ACETATE 50 MG/1
50 TABLET ORAL 2 TIMES DAILY
Qty: 120 TABLET | Refills: 0 | Status: SHIPPED | OUTPATIENT
Start: 2021-12-06 | End: 2022-02-01

## 2021-12-06 NOTE — TELEPHONE ENCOUNTER
Patient requesting refill on flec, but is overdue for f/u.      Please reach out to patient to schedule appt, and I can send enough refill to get her through until appt.    Thank you!

## 2022-01-14 ENCOUNTER — OFFICE VISIT (OUTPATIENT)
Dept: CARDIOLOGY | Facility: CLINIC | Age: 42
End: 2022-01-14

## 2022-01-14 VITALS
HEART RATE: 82 BPM | DIASTOLIC BLOOD PRESSURE: 90 MMHG | BODY MASS INDEX: 30.34 KG/M2 | HEIGHT: 72 IN | WEIGHT: 224 LBS | SYSTOLIC BLOOD PRESSURE: 124 MMHG

## 2022-01-14 DIAGNOSIS — R00.2 PALPITATIONS: ICD-10-CM

## 2022-01-14 DIAGNOSIS — I10 ESSENTIAL HYPERTENSION: ICD-10-CM

## 2022-01-14 DIAGNOSIS — I49.3 UNIFOCAL PVCS: Primary | ICD-10-CM

## 2022-01-14 PROCEDURE — 93000 ELECTROCARDIOGRAM COMPLETE: CPT | Performed by: INTERNAL MEDICINE

## 2022-01-14 PROCEDURE — 99214 OFFICE O/P EST MOD 30 MIN: CPT | Performed by: INTERNAL MEDICINE

## 2022-01-14 NOTE — PROGRESS NOTES
Date of Office Visit: 2022  Encounter Provider: Duarte Rodriguez MD  Place of Service: [facility] CARDIOLOGY  Patient Name: Jeanne Wei  : 1980    Subjective:     Encounter Date:2022      Patient ID: Jeanne Wei is a 41 y.o. female who has a cc of  Benign PVCs now suppressed on Flec.     She knows the flec is working b/c at the end of cycle or when it is due she feels palp.     The patient had a good year.   No anginal chest pain,   No sig ying,   No soa,   No fainting,  No orthostasis.   No edema.   Exercise tolerance: very good.     Working on weight loss.     There have been no hospital admission since the last visit.     There have been no bleeding events.       Past Medical History:   Diagnosis Date   • Chest pain    • Dizziness    • GERD (gastroesophageal reflux disease)    • Hypertension    • Palpitation    • PVC (premature ventricular contraction)    • Tachycardia    • Unifocal PVCs        Social History     Socioeconomic History   • Marital status:    Tobacco Use   • Smoking status: Never Smoker   • Smokeless tobacco: Never Used   • Tobacco comment: caffeine on occas.    Vaping Use   • Vaping Use: Never used   Substance and Sexual Activity   • Alcohol use: No   • Drug use: Defer   • Sexual activity: Defer       Family History   Problem Relation Age of Onset   • Sarcoidosis Mother    • Hypertension Mother    • Hypertension Father    • Heart disease Maternal Grandmother    • Stroke Maternal Grandmother    • Heart disease Maternal Grandfather    • Diabetes Maternal Grandfather    • Stroke Maternal Grandfather    • Heart disease Paternal Grandmother    • Heart disease Paternal Grandfather    • Diabetes Paternal Grandfather        Review of Systems   Constitutional: Negative for fever and night sweats.   HENT: Negative for ear pain and stridor.    Eyes: Negative for discharge and visual halos.   Cardiovascular: Negative for cyanosis.   Respiratory:  "Negative for hemoptysis and sputum production.    Hematologic/Lymphatic: Negative for adenopathy.   Skin: Negative for nail changes and unusual hair distribution.   Musculoskeletal: Negative for gout and joint swelling.   Gastrointestinal: Negative for bowel incontinence and flatus.   Genitourinary: Negative for dysuria and flank pain.   Neurological: Negative for seizures and tremors.   Psychiatric/Behavioral: Negative for altered mental status. The patient is not nervous/anxious.             Objective:     Vitals:    01/14/22 0834   BP: 124/90   Pulse: 82   Weight: 102 kg (224 lb)   Height: 185.4 cm (73\")         Eyes:      General:         Right eye: No discharge.         Left eye: No discharge.   HENT:      Head: Normocephalic and atraumatic.   Neck:      Thyroid: No thyromegaly.      Vascular: No JVD.   Pulmonary:      Effort: Pulmonary effort is normal.      Breath sounds: Normal breath sounds. No rales.   Cardiovascular:      Normal rate. Regular rhythm.      No gallop.   Edema:     Peripheral edema absent.   Abdominal:      General: Bowel sounds are normal.      Palpations: Abdomen is soft.      Tenderness: There is no abdominal tenderness.   Musculoskeletal: Normal range of motion.         General: No deformity. Skin:     General: Skin is warm and dry.      Findings: No erythema.   Neurological:      Mental Status: Alert and oriented to person, place, and time.      Motor: Normal muscle tone.   Psychiatric:         Behavior: Behavior normal.         Thought Content: Thought content normal.           ECG 12 Lead    Date/Time: 1/14/2022 9:35 AM  Performed by: Duarte Rodriguez MD  Authorized by: Duarte Rodriguez MD   Comparison: compared with previous ECG   Similar to previous ECG  Rhythm: sinus rhythm  Rate: normal  Conduction: conduction normal  ST Segments: ST segments normal  T Waves: T waves normal  QRS axis: normal    Clinical impression: normal ECG            Lab Review:       Assessment:          " Diagnosis Plan   1. Unifocal PVCs     2. Palpitations     3. Essential hypertension            Plan:     PVCs -- controlled nicely on flec -- no signs or symptoms of structural heart disease. QRS narrow.     She is working on weight loss.     She takes ASA for prevention of VTE -- that she had years ago.

## 2022-02-01 RX ORDER — FLECAINIDE ACETATE 50 MG/1
TABLET ORAL
Qty: 120 TABLET | Refills: 0 | Status: SHIPPED | OUTPATIENT
Start: 2022-02-01 | End: 2022-02-09

## 2022-02-09 RX ORDER — FLECAINIDE ACETATE 50 MG/1
TABLET ORAL
Qty: 120 TABLET | Refills: 0 | Status: SHIPPED | OUTPATIENT
Start: 2022-02-09 | End: 2022-07-06

## 2022-02-21 ENCOUNTER — OFFICE VISIT (OUTPATIENT)
Dept: NEUROLOGY | Facility: CLINIC | Age: 42
End: 2022-02-21

## 2022-02-21 VITALS
HEART RATE: 68 BPM | WEIGHT: 224.87 LBS | HEIGHT: 72 IN | SYSTOLIC BLOOD PRESSURE: 130 MMHG | BODY MASS INDEX: 30.46 KG/M2 | DIASTOLIC BLOOD PRESSURE: 76 MMHG | RESPIRATION RATE: 18 BRPM

## 2022-02-21 DIAGNOSIS — G43.821 MENSTRUAL MIGRAINE WITH STATUS MIGRAINOSUS, NOT INTRACTABLE: Primary | ICD-10-CM

## 2022-02-21 PROCEDURE — 99203 OFFICE O/P NEW LOW 30 MIN: CPT | Performed by: STUDENT IN AN ORGANIZED HEALTH CARE EDUCATION/TRAINING PROGRAM

## 2022-02-21 RX ORDER — RIZATRIPTAN BENZOATE 10 MG/1
TABLET ORAL
COMMUNITY
Start: 2021-12-14 | End: 2022-02-21

## 2022-02-21 RX ORDER — IBUPROFEN 600 MG/1
TABLET ORAL
COMMUNITY
Start: 2021-11-11 | End: 2022-05-23

## 2022-02-21 RX ORDER — DOXYCYCLINE HYCLATE 100 MG/1
100 CAPSULE ORAL DAILY
COMMUNITY
Start: 2022-01-03 | End: 2023-01-06

## 2022-02-21 RX ORDER — FROVATRIPTAN SUCCINATE 2.5 MG/1
2.5 TABLET, FILM COATED ORAL ONCE AS NEEDED
Qty: 9 TABLET | Refills: 2 | Status: SHIPPED | OUTPATIENT
Start: 2022-02-21 | End: 2022-04-08 | Stop reason: SDUPTHER

## 2022-02-21 RX ORDER — IBUPROFEN 800 MG/1
800 TABLET ORAL EVERY 12 HOURS
COMMUNITY
Start: 2022-01-15 | End: 2022-05-23

## 2022-02-21 RX ORDER — SULFAMETHOXAZOLE AND TRIMETHOPRIM 800; 160 MG/1; MG/1
1 TABLET ORAL 2 TIMES DAILY WITH MEALS
COMMUNITY
Start: 2021-11-23 | End: 2022-05-23

## 2022-02-21 NOTE — PROGRESS NOTES
Chief Complaint   Patient presents with   • Migraine     all the time around her period but now had her Tub removed and uterus burned but still has Headaches        Patient ID: Jeanne Wei is a 41 y.o. female.    HPI:    The following portions of the patient's history were reviewed and updated as appropriate: allergies, current medications, past family history, past medical history, past social history, past surgical history and problem list.    Review of Systems   Constitutional: Negative for activity change, chills and fatigue.   HENT: Positive for sinus pressure and sinus pain. Negative for ear pain, hearing loss, nosebleeds, tinnitus and trouble swallowing.    Eyes: Positive for photophobia and visual disturbance.   Respiratory: Negative for cough, chest tightness, shortness of breath and wheezing.    Cardiovascular: Negative for chest pain, palpitations and leg swelling.   Gastrointestinal: Negative for abdominal pain, blood in stool, diarrhea and rectal pain.   Endocrine: Negative for cold intolerance and heat intolerance.   Genitourinary: Negative for difficulty urinating, flank pain, hematuria, vaginal bleeding and vaginal pain.   Musculoskeletal: Negative for back pain and gait problem.   Allergic/Immunologic: Negative for food allergies.   Neurological: Positive for headaches. Negative for dizziness, speech difficulty, weakness, light-headedness and numbness.   Hematological: Bruises/bleeds easily.   Psychiatric/Behavioral: Positive for sleep disturbance. Negative for agitation, confusion and decreased concentration. The patient is not nervous/anxious and is not hyperactive.    Mrs. Wei is a 41-year-old female with history of high blood pressure and extended menstrual cycles. It has gotten worse since June and was worst in December. Headache starts 5-7 days before menstrual cycle.  Has a mild headache today which is improved after OBGYN procedures.   Migraine  Onset started 10 years ago.but  gotten worse since June and worst in December.   Aura - none  Location band across head or like a cap all over the head, L>R at times.  Duration 3 weeks long, can fluctuate in intensity.  Frequency once a month  ASSOCIATED SYMPTOMS: nausea and vomiting only in December. Sound makes it worse. Severe to point of waking her up. No tearing or congestion, no unilateral weakness. Has minutes of darkening of vision a couple times a day during headaches and returns completely back to normal. Also has floaters.  Alleviating/exacerbating - Sleeping makes it better.  Caffeine doesn't help. Bearing down makes it worse.  Pain character throbbing  CARDIAC HISTORY: 3/4 grandparents in family with heart disease and stroke.   Takes flecainide for heart rhythm.   Does not smoke or drink  No HLD or high cholesterol, no HTN  No CAD or heart disease or stroke, herself.  Prior medications - tried ibuprofen, excedrin, midol, Aleve - nothing helped.  Days per month, 7-21 December/January/Feburary went from 21 to 14 to 7, how many days are severe - 5 in February.  Had left ovary taken out, tubes tied and did an ablation in December.  In February lasted 6 to 7 days and milder in severity.       Vitals:    02/21/22 1531   BP: 130/76   Pulse: 68   Resp: 18       Neurologic Exam     Mental Status   Attention: normal. Concentration: normal.   Speech: speech is normal   Level of consciousness: alert    Cranial Nerves     CN II   Visual fields full to confrontation.   Visual acuity: normal    CN III, IV, VI   Pupils are equal, round, and reactive to light.  Extraocular motions are normal.     CN V   Facial sensation intact.     CN VII   Facial expression full, symmetric.     CN VIII   Hearing: intact    CN IX, X   Palate: symmetric    CN XI   Right trapezius strength: normal  Left trapezius strength: normal    CN XII   Tongue: not atrophic  Fasciculations: absent  Tongue deviation: none    Motor Exam   Muscle bulk: normal    Strength   Right  deltoid: 5/5  Left deltoid: 5/5  Right biceps: 5/5  Left biceps: 5/5  Right triceps: 5/5  Left triceps: 5/5  Right iliopsoas: 5/5  Left iliopsoas: 5/5  Right quadriceps: 5/5  Left quadriceps: 5/5  Right hamstrin/5  Left hamstrin/5  Right anterior tibial: 5/5  Left anterior tibial: 5/5  Right gastroc: 5/5  Left gastroc: 5/5Grip 5 out of 5 bilaterally     Sensory Exam   Right arm light touch: normal  Left arm light touch: normal  Right leg light touch: normal  Left leg light touch: normal    Gait, Coordination, and Reflexes     Coordination   Finger to nose coordination: normal  Heel to shin coordination: normal      Physical Exam  Eyes:      Extraocular Movements: EOM normal.      Pupils: Pupils are equal, round, and reactive to light.   Neurological:      Coordination: Finger-Nose-Finger Test and Heel to Shin Test normal.   Psychiatric:         Speech: Speech normal.         Procedures    Assessment/Plan:    Patient with menstrual/hormonal migraines based on clinical history, improving after OBGYN interventions.    Plan  -stop rizatriptan  -start frovatriptan 2.5mg PRN for migraine, max 7.5mg in a day. Discussed medication overuse headache  RTC 10 weeks.    I spent 31 minutes caring for this patient on this date of service. This time includes time spent by me in the following activities as necessary: preparing for the visit, reviewing tests, medical records and previous visits, obtaining and/or reviewing a separately obtained history, performing a medically appropriate exam and/or evaluation, counseling and educating the patient, referring and/or communicating with other healthcare professionals, documenting information in the medical record, independently interpreting results and communicating that information with the patient, and developing a medically appropriate treatment plan with consideration of other conditions, medications, and treatments.       There are no diagnoses linked to this encounter.        Zeferino Daley MD

## 2022-02-22 ENCOUNTER — TELEPHONE (OUTPATIENT)
Dept: NEUROLOGY | Facility: CLINIC | Age: 42
End: 2022-02-22

## 2022-02-22 NOTE — TELEPHONE ENCOUNTER
Caller: Jeanne Wei    Relationship: Self    Best call back number: [phone]    What medications are you currently taking:   Current Outpatient Medications on File Prior to Visit   Medication Sig Dispense Refill   • aspirin 81 MG EC tablet Take 81 mg by mouth Daily.     • Aspirin-Acetaminophen-Caffeine (EXCEDRIN PO) Take  by mouth As Needed.     • doxycycline (VIBRAMYCIN) 100 MG capsule Take 100 mg by mouth Daily.     • esomeprazole (nexIUM) 20 MG capsule Take 20 mg by mouth Daily As Needed.     • Fexofenadine HCl (MUCINEX ALLERGY PO) Take  by mouth As Needed.     • flecainide (TAMBOCOR) 50 MG tablet TAKE 1 TABLET BY MOUTH TWICE DAILY 120 tablet 0   • frovatriptan (FROVA) 2.5 MG tablet Take 1 tablet by mouth 1 (One) Time As Needed for Migraine for up to 30 days. If recurs, may repeat after 2 hours. Max of 3 tabs in 24 hours. 9 tablet 2   • ibuprofen (ADVIL,MOTRIN) 200 MG tablet Take 200 mg by mouth Every 6 (Six) Hours As Needed for Mild Pain .     • ibuprofen (ADVIL,MOTRIN) 600 MG tablet TAKE 1 TABLET BY MOUTH EVERY 6 HOURS FOR 48 HOURS BEFORE PROCEDURE THEN AS NEEDED FOR PAIN     • ibuprofen (ADVIL,MOTRIN) 800 MG tablet Take 800 mg by mouth Every 12 (Twelve) Hours.     • levonorgestrel-ethinyl estradiol (SEASONALE) 0.15-0.03 MG per tablet Take 1 tablet by mouth Daily.     • sulfamethoxazole-trimethoprim (BACTRIM DS,SEPTRA DS) 800-160 MG per tablet Take 1 tablet by mouth 2 (Two) Times a Day With Meals.       No current facility-administered medications on file prior to visit.          When did you start taking these medications: N/A     Which medication are you concerned about: FROVATRIPTAN     Who prescribed you this medication: DR. DODGE    What are your concerns: PATIENT CALLED TO CHECK ON THE STATUS OF A FAX FROM [facility] FOR THE MEDICATION FROVATRIPTAN, THAT WAS SENT OVER LAST NIGHT, SHE STATES WE NEED TO FILL OUT THE FORM.    PLEASE ADVISE

## 2022-02-22 NOTE — TELEPHONE ENCOUNTER
Sanket Nunez, call the pharmacy and see what form is required, they may need a prior auth for this medication.

## 2022-02-28 ENCOUNTER — PATIENT ROUNDING (BHMG ONLY) (OUTPATIENT)
Dept: NEUROLOGY | Facility: CLINIC | Age: 42
End: 2022-02-28

## 2022-03-04 ENCOUNTER — TELEPHONE (OUTPATIENT)
Dept: NEUROLOGY | Facility: CLINIC | Age: 42
End: 2022-03-04

## 2022-03-04 NOTE — TELEPHONE ENCOUNTER
----- Message from Zeferino Daley MD sent at 3/4/2022  9:40 AM EST -----  Plan does not cover frovatriptan without prior auth. Call plan at [phone] to initiate prior authorization process, Pt ID [id number]. Please let me know if any information needed.

## 2022-03-04 NOTE — TELEPHONE ENCOUNTER
I called the Patient and made her aware of that her medication was prio authorized for 02/25/2022 to 02/25/2023 for # 9 pills of Frova 2.5 mg ,Patient thanked us and will contact her Pharmacy inflammatory bowel disease gave her just in case the PA number so the Pharmacy can run it ,Mayra Abbott

## 2022-04-08 NOTE — TELEPHONE ENCOUNTER
Caller: Jeanne Wei    Relationship: Self    Best call back number: [phone]    Requested Prescriptions:   Requested Prescriptions     Pending Prescriptions Disp Refills   • frovatriptan (FROVA) 2.5 MG tablet 9 tablet 2     Sig: Take 1 tablet by mouth 1 (One) Time As Needed for Migraine for up to 30 days. If recurs, may repeat after 2 hours. Max of 3 tabs in 24 hours.      Pharmacy where request should be sent:    [facility] IN [place] ON [address]    Additional details provided by patient:   PT CALLED THE PHARM TO GET THIS REFILLED AND WAS TOLD SHE COULDN'T GET IT REFILLED UNTIL 4-25-22 AND PT IS OUT.    Does the patient have less than a 3 day supply:  [x] Yes  [] No    Sp Perez Rep   04/08/22 11:54 EDT

## 2022-04-11 RX ORDER — FROVATRIPTAN SUCCINATE 2.5 MG/1
2.5 TABLET, FILM COATED ORAL ONCE AS NEEDED
Qty: 9 TABLET | Refills: 2 | Status: SHIPPED | OUTPATIENT
Start: 2022-04-11 | End: 2022-05-23 | Stop reason: SDUPTHER

## 2022-05-23 ENCOUNTER — OFFICE VISIT (OUTPATIENT)
Dept: NEUROLOGY | Facility: CLINIC | Age: 42
End: 2022-05-23

## 2022-05-23 VITALS
SYSTOLIC BLOOD PRESSURE: 128 MMHG | DIASTOLIC BLOOD PRESSURE: 70 MMHG | HEIGHT: 72 IN | BODY MASS INDEX: 30.46 KG/M2 | RESPIRATION RATE: 16 BRPM | WEIGHT: 224.87 LBS | HEART RATE: 64 BPM

## 2022-05-23 DIAGNOSIS — G43.821 MENSTRUAL MIGRAINE WITH STATUS MIGRAINOSUS, NOT INTRACTABLE: Primary | ICD-10-CM

## 2022-05-23 PROCEDURE — 99213 OFFICE O/P EST LOW 20 MIN: CPT | Performed by: STUDENT IN AN ORGANIZED HEALTH CARE EDUCATION/TRAINING PROGRAM

## 2022-05-23 RX ORDER — DIETHYLPROPION HYDROCHLORIDE 75 MG/1
TABLET ORAL
COMMUNITY
Start: 2022-03-31 | End: 2023-01-06

## 2022-05-23 RX ORDER — IBUPROFEN 800 MG/1
800 TABLET ORAL DAILY PRN
Start: 2022-05-23 | End: 2023-01-06

## 2022-05-23 RX ORDER — FLUCONAZOLE 150 MG/1
150 TABLET ORAL ONCE
COMMUNITY
Start: 2022-05-09 | End: 2022-05-23 | Stop reason: SDUPTHER

## 2022-05-23 RX ORDER — FROVATRIPTAN SUCCINATE 2.5 MG/1
2.5 TABLET, FILM COATED ORAL ONCE AS NEEDED
Qty: 9 TABLET | Refills: 2 | Status: SHIPPED | OUTPATIENT
Start: 2022-05-23 | End: 2022-08-22 | Stop reason: SDUPTHER

## 2022-05-23 RX ORDER — DIETHYLPROPION HYDROCHLORIDE 25 MG/1
1 TABLET ORAL 2 TIMES DAILY
COMMUNITY
Start: 2022-03-04 | End: 2023-01-06

## 2022-05-23 RX ORDER — PROPRANOLOL HYDROCHLORIDE 20 MG/1
20 TABLET ORAL 2 TIMES DAILY
Qty: 60 TABLET | Refills: 2 | Status: SHIPPED | OUTPATIENT
Start: 2022-05-23 | End: 2022-08-22 | Stop reason: SDUPTHER

## 2022-05-23 NOTE — PROGRESS NOTES
Chief Complaint   Patient presents with   • Headache     Patient on and off on menstrual cycle        Patient ID: Jeanne Wei is a 41 y.o. female.    HPI:    Mrs. Wei is a 41-year-old female with history of high blood pressure and extended menstrual cycles. It has gotten worse since June and was worst in December. Headache starts 5-7 days before menstrual cycle.  Has a mild headache today which is improved after OBGYN procedures.   Migraine  Onset started 10 years ago.but gotten worse since June and worst in December.   Aura - none  Location band across head or like a cap all over the head, L>R at times.  Duration 3 weeks long, can fluctuate in intensity.  Frequency once a month  ASSOCIATED SYMPTOMS: nausea and vomiting only in December. Sound makes it worse. Severe to point of waking her up. No tearing or congestion, no unilateral weakness. Has minutes of darkening of vision a couple times a day during headaches and returns completely back to normal. Also has floaters.  Alleviating/exacerbating - Sleeping makes it better.  Caffeine doesn't help. Bearing down makes it worse.  Pain character throbbing  CARDIAC HISTORY: 3/4 grandparents in family with heart disease and stroke.   Takes flecainide for heart rhythm.   Does not smoke or drink  No HLD or high cholesterol, no HTN  No CAD or heart disease or stroke, herself.  Prior medications - tried ibuprofen, excedrin, midol, Aleve - nothing helped.  Days per month, 7-21 December/January/Feburary went from 21 to 14 to 7, how many days are severe - 5 in February.  Had left ovary taken out, tubes tied and did an ablation in December.  In February lasted 6 to 7 days and milder in severity.     The following portions of the patient's history were reviewed and updated as appropriate: allergies, current medications, past family history, past medical history, past social history, past surgical history and problem list.    Interval history:  Frovatriptan helps but  the headache comes back the same day. It helped for a couple of hours. But still having headaches up to 15 days a month. Has never tried preventatives before. No history of kidney stones or asthma. Has been trying to lose weight.   Had a headache that started last Monday 5/16.        Review of Systems   Constitutional: Positive for activity change and fatigue. Negative for chills.   HENT: Negative for facial swelling, nosebleeds, rhinorrhea, sinus pain and tinnitus.    Eyes: Negative for photophobia and visual disturbance.   Respiratory: Negative for choking, shortness of breath and wheezing.    Cardiovascular: Negative for chest pain, palpitations and leg swelling.   Gastrointestinal: Negative for abdominal pain, nausea and rectal pain.   Endocrine: Negative for cold intolerance and heat intolerance.   Genitourinary: Negative for dysuria, frequency and urgency.   Musculoskeletal: Negative for back pain, joint swelling and neck stiffness.   Allergic/Immunologic: Negative for food allergies.   Neurological: Positive for dizziness, light-headedness and headaches. Negative for tremors.   Hematological: Does not bruise/bleed easily.   Psychiatric/Behavioral: Negative for agitation, confusion and sleep disturbance. The patient is not nervous/anxious.       Vitals:    05/23/22 1526   BP: 128/70   Pulse: 64   Resp: 16       Neurologic Exam     Mental Status   Attention: normal. Concentration: normal.   Speech: speech is normal   Level of consciousness: alert    Cranial Nerves     CN II   Visual fields full to confrontation.   Visual acuity: normal    CN III, IV, VI   Pupils are equal, round, and reactive to light.  Extraocular motions are normal.     CN V   Facial sensation intact.     CN VII   Facial expression full, symmetric.     CN VIII   Hearing: intact    CN IX, X   Palate: symmetric    CN XI   Right trapezius strength: normal  Left trapezius strength: normal    CN XII   Tongue: not atrophic  Fasciculations:  absent  Tongue deviation: none    Motor Exam   Muscle bulk: normal    Strength   Right deltoid: 5/5  Left deltoid: 5/5  Right biceps: 5/5  Left biceps: 5/5  Right triceps: 5/5  Left triceps: 5/5  Right iliopsoas: 5/5  Left iliopsoas: 5/5  Right quadriceps: 5/5  Left quadriceps: 5/5  Right hamstrin/5  Left hamstrin/5  Right anterior tibial: 5/5  Left anterior tibial: 5/5  Right gastroc: 5/5  Left gastroc: 5/5Grip 5 out of 5 bilaterally     Sensory Exam   Right arm light touch: normal  Left arm light touch: normal  Right leg light touch: normal  Left leg light touch: normal    Gait, Coordination, and Reflexes     Coordination   Finger to nose coordination: normal  Heel to shin coordination: normal      Physical Exam  Eyes:      Extraocular Movements: EOM normal.      Pupils: Pupils are equal, round, and reactive to light.   Neurological:      Coordination: Finger-Nose-Finger Test and Heel to Shin Test normal.   Psychiatric:         Speech: Speech normal.         Procedures    Assessment/Plan:    Patient with migraine headaches, with possible hormonal/menstrual component.  Plan  -can use 800mg ibuprofen PRN daily or frovatriptan 2.5mg max 3 times in 24 hours for abortive  -no more than 3 days per week for all abortives due to risk for medication overuse headache.  -Propranolol 20 mg twice daily for migraine prevention.  Discussed side effects including low heart rate and low blood pressure and that she will call her heart rate drops below 55 or below 60 and she is symptomatic.  Also she should let the clinic know if her blood pressure drops below 90 and she is symptomatic.  -Headache hygiene discussed.  -continue with OB/GYN care  -Consider alternate treatment in future if still requiring to use multiple from the ventilator     Diagnoses and all orders for this visit:    1. Menstrual migraine with status migrainosus, not intractable (Primary)    Other orders  -     frovatriptan (FROVA) 2.5 MG tablet; Take 1  tablet by mouth 1 (One) Time As Needed for Migraine for up to 30 days. If recurs, may repeat after 2 hours. Max of 3 tabs in 24 hours.  Dispense: 9 tablet; Refill: 2  -     propranolol (INDERAL) 20 MG tablet; Take 1 tablet by mouth 2 (Two) Times a Day.  Dispense: 60 tablet; Refill: 2  -     ibuprofen (ADVIL,MOTRIN) 800 MG tablet; Take 1 tablet by mouth Daily As Needed for Headache.         I spent 27 minutes caring for this patient on this date of service. This time includes time spent by me in the following activities as necessary: preparing for the visit, reviewing tests, medical records and previous visits, obtaining and/or reviewing a separately obtained history, performing a medically appropriate exam and/or evaluation, counseling and educating the patient, and/or communicating with other healthcare professionals, documenting information in the medical record, independently interpreting results and communicating that information with the patient, and developing a medically appropriate treatment plan with consideration of other conditions, medications, and treatments.  Return in about 10 weeks (around 8/1/2022).    Zeferino Daley MD

## 2022-07-06 RX ORDER — FLECAINIDE ACETATE 50 MG/1
TABLET ORAL
Qty: 120 TABLET | Refills: 0 | Status: SHIPPED | OUTPATIENT
Start: 2022-07-06 | End: 2022-09-26

## 2022-08-22 ENCOUNTER — OFFICE VISIT (OUTPATIENT)
Dept: NEUROLOGY | Facility: CLINIC | Age: 42
End: 2022-08-22

## 2022-08-22 VITALS
DIASTOLIC BLOOD PRESSURE: 70 MMHG | HEIGHT: 72 IN | HEART RATE: 58 BPM | WEIGHT: 224 LBS | BODY MASS INDEX: 30.34 KG/M2 | RESPIRATION RATE: 16 BRPM | SYSTOLIC BLOOD PRESSURE: 128 MMHG

## 2022-08-22 DIAGNOSIS — G43.821 MENSTRUAL MIGRAINE WITH STATUS MIGRAINOSUS, NOT INTRACTABLE: Primary | ICD-10-CM

## 2022-08-22 PROCEDURE — 99212 OFFICE O/P EST SF 10 MIN: CPT | Performed by: STUDENT IN AN ORGANIZED HEALTH CARE EDUCATION/TRAINING PROGRAM

## 2022-08-22 RX ORDER — FROVATRIPTAN SUCCINATE 2.5 MG/1
2.5 TABLET, FILM COATED ORAL ONCE AS NEEDED
Qty: 9 TABLET | Refills: 2 | Status: SHIPPED | OUTPATIENT
Start: 2022-08-22 | End: 2022-09-26

## 2022-08-22 RX ORDER — PROPRANOLOL HYDROCHLORIDE 20 MG/1
20 TABLET ORAL 2 TIMES DAILY
Qty: 60 TABLET | Refills: 2 | Status: SHIPPED | OUTPATIENT
Start: 2022-08-22 | End: 2023-01-06

## 2022-09-26 RX ORDER — FROVATRIPTAN SUCCINATE 2.5 MG/1
TABLET, FILM COATED ORAL
Qty: 9 TABLET | Refills: 2 | Status: SHIPPED | OUTPATIENT
Start: 2022-09-26 | End: 2023-01-06

## 2022-09-26 RX ORDER — FLECAINIDE ACETATE 50 MG/1
TABLET ORAL
Qty: 120 TABLET | Refills: 0 | Status: SHIPPED | OUTPATIENT
Start: 2022-09-26 | End: 2022-11-28

## 2022-11-28 RX ORDER — FLECAINIDE ACETATE 50 MG/1
TABLET ORAL
Qty: 120 TABLET | Refills: 0 | Status: SHIPPED | OUTPATIENT
Start: 2022-11-28 | End: 2023-01-06

## 2023-01-06 ENCOUNTER — OFFICE VISIT (OUTPATIENT)
Dept: SURGERY | Facility: CLINIC | Age: 43
End: 2023-01-06
Payer: COMMERCIAL

## 2023-01-06 VITALS
WEIGHT: 224 LBS | DIASTOLIC BLOOD PRESSURE: 74 MMHG | BODY MASS INDEX: 30.34 KG/M2 | HEART RATE: 72 BPM | RESPIRATION RATE: 16 BRPM | HEIGHT: 72 IN | SYSTOLIC BLOOD PRESSURE: 122 MMHG

## 2023-01-06 DIAGNOSIS — L72.0 EPIDERMOID CYST OF SKIN OF SCALP: Primary | ICD-10-CM

## 2023-01-06 PROCEDURE — 99203 OFFICE O/P NEW LOW 30 MIN: CPT | Performed by: SURGERY

## 2023-01-06 RX ORDER — ESTRADIOL 0.03 MG/D
1 FILM, EXTENDED RELEASE TRANSDERMAL 2 TIMES WEEKLY
COMMUNITY

## 2023-01-06 RX ORDER — FLECAINIDE ACETATE 50 MG/1
25 TABLET ORAL DAILY
COMMUNITY

## 2023-01-06 NOTE — PROGRESS NOTES
Jeanne Wei 42 y.o. female presents @ the req of Dr. Lopes for eval of multiple masses on scalp. Pt reports they have increased in number, size, and are painful.   Chief Complaint   Patient presents with   • Mass     Multiple masses on scalp             HPI   Above-noted agree.  This very pleasant 42-year-old female has multiple epidermoid cyst on her scalp.  They are growing in size and painful.  They have been there for many years.  She tolerates a regular diet without nausea or vomiting.  She was her bowels with difficulty.  She has a chest pain or shortness of breath.  She has no other complaints.      Review of Systems   All other systems reviewed and are negative.            Current Outpatient Medications:   •  esomeprazole (nexIUM) 20 MG capsule, Take 20 mg by mouth Daily As Needed., Disp: , Rfl:   •  estradiol (VIVELLE-DOT) 0.025 MG/24HR patch, Place 1 patch on the skin as directed by provider 2 (Two) Times a Week., Disp: , Rfl:   •  Fexofenadine HCl (MUCINEX ALLERGY PO), Take  by mouth As Needed., Disp: , Rfl:   •  flecainide (TAMBOCOR) 50 MG tablet, Take 50 mg by mouth Every 12 (Twelve) Hours., Disp: , Rfl:         Allergies   Allergen Reactions   • Amoxicillin Nausea And Vomiting   • Penicillins Other (See Comments)     Rash,migraine,vomiting   • Percocet [Oxycodone-Acetaminophen] Nausea Only           Past Medical History:   Diagnosis Date   • Chest pain    • Dizziness    • GERD (gastroesophageal reflux disease)    • Hypertension    • Palpitation    • PVC (premature ventricular contraction)    • Tachycardia    • Unifocal PVCs            Past Surgical History:   Procedure Laterality Date   • ENDOSCOPY  2006           Social History     Tobacco Use   • Smoking status: Never   • Smokeless tobacco: Never   • Tobacco comments:     caffeine on occas.    Vaping Use   • Vaping Use: Never used   Substance Use Topics   • Alcohol use: No   • Drug use: Defer             There is no immunization history on  file for this patient.        Physical Exam  Vitals and nursing note reviewed.   Constitutional:       Appearance: Normal appearance.   HENT:      Head:      Comments: There are multiple cysts on the scalp ranging in size from 2 cm to less than 1 cm  Cardiovascular:      Rate and Rhythm: Normal rate and regular rhythm.   Pulmonary:      Effort: Pulmonary effort is normal.      Breath sounds: Normal breath sounds.   Abdominal:      General: Bowel sounds are normal.      Palpations: Abdomen is soft.   Musculoskeletal:         General: No swelling or tenderness.   Skin:     General: Skin is warm and dry.   Neurological:      General: No focal deficit present.      Mental Status: She is alert and oriented to person, place, and time.   Psychiatric:         Mood and Affect: Mood normal.         Behavior: Behavior normal.         Debilities/Disabilities Identified: None    Emotional Behavior: Appropriate      /74   Pulse 72   Resp 16   Ht 185.4 cm (73\")   Wt 102 kg (224 lb)   BMI 29.55 kg/m²         Diagnoses and all orders for this visit:    1. Epidermoid cyst of skin of scalp (Primary)    Due to the large number of cysts we will excise this in surgery with anesthesia.  We discussed the benefits risks not limited to including: Bleeding, infection, recurrence, anesthesia complications.  Jeanne appeared to understand and is want to proceed.    Thank you for allowing me to participate in the care of this interesting patient.

## 2023-01-18 ENCOUNTER — PRE-ADMISSION TESTING (OUTPATIENT)
Dept: PREADMISSION TESTING | Facility: HOSPITAL | Age: 43
End: 2023-01-18
Payer: COMMERCIAL

## 2023-01-18 VITALS
OXYGEN SATURATION: 98 % | HEART RATE: 98 BPM | BODY MASS INDEX: 30.34 KG/M2 | SYSTOLIC BLOOD PRESSURE: 122 MMHG | WEIGHT: 224 LBS | RESPIRATION RATE: 16 BRPM | DIASTOLIC BLOOD PRESSURE: 84 MMHG | HEIGHT: 72 IN

## 2023-01-18 LAB
ANION GAP SERPL CALCULATED.3IONS-SCNC: 11.4 MMOL/L (ref 5–15)
BUN SERPL-MCNC: 8 MG/DL (ref 6–20)
BUN/CREAT SERPL: 11.3 (ref 7–25)
CALCIUM SPEC-SCNC: 8.8 MG/DL (ref 8.6–10.5)
CHLORIDE SERPL-SCNC: 101 MMOL/L (ref 98–107)
CO2 SERPL-SCNC: 23.6 MMOL/L (ref 22–29)
CREAT SERPL-MCNC: 0.71 MG/DL (ref 0.57–1)
DEPRECATED RDW RBC AUTO: 45.1 FL (ref 37–54)
EGFRCR SERPLBLD CKD-EPI 2021: 110.4 ML/MIN/1.73
ERYTHROCYTE [DISTWIDTH] IN BLOOD BY AUTOMATED COUNT: 13.2 % (ref 12.3–15.4)
GLUCOSE SERPL-MCNC: 101 MG/DL (ref 65–99)
HCT VFR BLD AUTO: 37.7 % (ref 34–46.6)
HGB BLD-MCNC: 12.3 G/DL (ref 12–15.9)
MCH RBC QN AUTO: 30.4 PG (ref 26.6–33)
MCHC RBC AUTO-ENTMCNC: 32.6 G/DL (ref 31.5–35.7)
MCV RBC AUTO: 93.3 FL (ref 79–97)
PLATELET # BLD AUTO: 258 10*3/MM3 (ref 140–450)
PMV BLD AUTO: 10 FL (ref 6–12)
POTASSIUM SERPL-SCNC: 3.8 MMOL/L (ref 3.5–5.2)
RBC # BLD AUTO: 4.04 10*6/MM3 (ref 3.77–5.28)
SODIUM SERPL-SCNC: 136 MMOL/L (ref 136–145)
WBC NRBC COR # BLD: 6.28 10*3/MM3 (ref 3.4–10.8)

## 2023-01-18 PROCEDURE — 36415 COLL VENOUS BLD VENIPUNCTURE: CPT

## 2023-01-18 PROCEDURE — 85027 COMPLETE CBC AUTOMATED: CPT | Performed by: SURGERY

## 2023-01-18 PROCEDURE — 80048 BASIC METABOLIC PNL TOTAL CA: CPT | Performed by: SURGERY

## 2023-01-18 NOTE — DISCHARGE INSTRUCTIONS
PRE-ADMISSION TESTING INSTRUCTIONS FOR ADULTS    Take these medications the morning of surgery with a small sip of water: flecainide      Do not take any insulin or diabetes medications the morning of surgery.      No aspirin, advil, aleve, ibuprofen, naproxen, diet pills, decongestants, or herbal/vitamins for a week prior to surgery.       Tylenol/Acetaminophen is okay to take if needed.    General Instructions:    DO NOT EAT SOLID FOOD OR DRINK LIQUIDS AFTER MIDNIGHT THE NIGHT BEFORE SURGERY. No gum, mints, or hard candy after midnight the night before surgery.    Patients who avoid smoking, chewing tobacco and alcohol for 4 weeks prior to surgery have a reduced risk of post-operative complications.  If at all possible, quit smoking as many days before surgery as you can.    Do not smoke, use chewing tobacco or drink alcohol the day of surgery    Bring your C-PAP/ BI-PAP machine if you use one.  Wear clean comfortable clothes and socks.  Do not wear contact lenses, lotion, deodorant, or make-up.  Bring a case for your glasses if applicable. You may brush your teeth the morning of surgery.  You may wear dentures/partials, do not put adhesive/glue on them.  Leave all other jewelry and valuables at home.      Preventing a Surgical Site Infection:    Shower the night before and on the morning of surgery using the chlorhexidine soap you were given.  Use a clean washcloth with the soap.  Place clean sheets on your bed after showering the night before surgery. Do not use the CHG soap on your hair, face, or private areas. Wash your body gently for five (5) minutes. Do not scrub your skin.  Dry with a clean towel and dress in clean clothing.  Do not shave the surgical area for 10 days-2 weeks prior to surgery  because the razor can irritate skin and make it easier to develop an infection.  Make sure you, your family, and all healthcare providers clean their hands with soap and water or an alcohol based hand   before caring for you or your wound.      Day of surgery:    Your surgeon’s office will advise you of your arrival time for the day of surgery.    Upon arrival, a Pre-op nurse and Anesthesia provider will review your health history, obtain vital signs, and answer questions you may have. The anesthesia provider will also discuss the type of anesthesia that will be needed for your procedure, which may include general anesthesia. The only belongings needed at this time will be your home medications and if applicable your C-PAP/BI-PAP machine.  If you are staying overnight your family can leave the rest of your belongings in the car and bring them to your room later.  A Pre-op nurse will start an IV and you may receive medication in preparation for surgery, including something to help you relax.  Your family will be able to see you in the Pre-op area.  While you are in surgery your family should notify the waiting room  if they leave the waiting room area and provide a contact phone number.    IF you have any questions, you can call the Pre-Admission Department at [phone] or your surgeon's office.  Notify your surgeon if  you become sick, have a fever, productive cough, or cannot be here the day of surgery    Please be aware that surgery does come with discomfort.  We want to make every effort to control your discomfort so please discuss any uncontrolled symptoms with your nurse.   Your doctor will most likely have prescribed pain medications.      If you are going home after surgery, you will receive individualized written care instructions before being discharged.  A responsible adult (over the age of 18) must drive you to and from the hospital on the day of your surgery and stay with you for 24 hours after anesthesia.    If you are staying overnight following surgery, you will be transported to your hospital room following the recovery period.  [facility] has all private rooms.    You  may receive a survey regarding the care you received. Your feedback is very important and will be used to collect the necessary data to help us to continue to provide excellent care.     Deductibles and co-payments are collected on the day of service. Please be prepared to pay the required co-pay, deductible or deposit on the day of service as defined by your plan.

## 2023-01-18 NOTE — PAT
Pt here for PAT visit.  Pre-op tests completed, shower w/antibacterial soap, and nothing to eat/drink after midnight night prior, voiced understanding.

## 2023-01-23 ENCOUNTER — TELEPHONE (OUTPATIENT)
Dept: SURGERY | Facility: CLINIC | Age: 43
End: 2023-01-23
Payer: COMMERCIAL

## 2023-01-24 ENCOUNTER — ANESTHESIA EVENT (OUTPATIENT)
Dept: SURGERY | Facility: SURGERY CENTER | Age: 43
End: 2023-01-24
Payer: COMMERCIAL

## 2023-01-25 ENCOUNTER — TELEPHONE (OUTPATIENT)
Dept: SURGERY | Facility: CLINIC | Age: 43
End: 2023-01-25
Payer: COMMERCIAL

## 2023-01-25 NOTE — TELEPHONE ENCOUNTER
Notified per financial clr that pt's ins will only approve surg at surg center. Pt informed.  Spoke with [facility] and OR block time obtained.  Pt's surg moved to [facility] on 02/22/2023 @ 1:30 pm with arrival time 11:30 am.  Pt informed and agreeable.

## 2023-01-26 ENCOUNTER — ANESTHESIA (OUTPATIENT)
Dept: SURGERY | Facility: SURGERY CENTER | Age: 43
End: 2023-01-26
Payer: COMMERCIAL

## 2023-01-27 ENCOUNTER — TELEPHONE (OUTPATIENT)
Dept: SURGERY | Facility: CLINIC | Age: 43
End: 2023-01-27

## 2023-01-27 NOTE — TELEPHONE ENCOUNTER
Caller: Jeanne Wei    Relationship to patient: Self    Best call back number: [phone]      Additional notes:PT CALLED TO CANCEL HER POST-OP APPT ON 2/3 SINCE HER PROCEDURE WAS RESCHEDULED TO 2/22.     ATTEMPTED TO VERIFY POST-OP SCHEDULING WITH PRACTICE TO RESCHEDULE POST-OP. NO ANSWER AT PRACTICE.    PLEASE CONTACT PT TO RESCHEDULE POST-OP APPT FOR AFTER HER PROCEDURE ON 2/22.

## 2023-02-22 ENCOUNTER — HOSPITAL ENCOUNTER (OUTPATIENT)
Facility: SURGERY CENTER | Age: 43
Setting detail: HOSPITAL OUTPATIENT SURGERY
Discharge: HOME | End: 2023-02-22
Attending: SURGERY | Admitting: SURGERY
Payer: COMMERCIAL

## 2023-02-22 VITALS
DIASTOLIC BLOOD PRESSURE: 82 MMHG | BODY MASS INDEX: 30.2 KG/M2 | SYSTOLIC BLOOD PRESSURE: 113 MMHG | HEIGHT: 72 IN | HEART RATE: 81 BPM | TEMPERATURE: 97 F | RESPIRATION RATE: 16 BRPM | WEIGHT: 223 LBS | OXYGEN SATURATION: 95 %

## 2023-02-22 DIAGNOSIS — L72.0 EPIDERMOID CYST OF SKIN OF SCALP: ICD-10-CM

## 2023-02-22 PROCEDURE — 11426 EXC H-F-NK-SP B9+MARG >4 CM: CPT | Performed by: SURGERY

## 2023-02-22 PROCEDURE — 11422 EXC H-F-NK-SP B9+MARG 1.1-2: CPT | Performed by: SURGERY

## 2023-02-22 PROCEDURE — 88304 TISSUE EXAM BY PATHOLOGIST: CPT | Performed by: SURGERY

## 2023-02-22 PROCEDURE — 25010000002 ONDANSETRON PER 1 MG: Performed by: NURSE ANESTHETIST, CERTIFIED REGISTERED

## 2023-02-22 PROCEDURE — 25010000002 PROPOFOL 10 MG/ML EMULSION: Performed by: ANESTHESIOLOGY

## 2023-02-22 PROCEDURE — 11421 EXC H-F-NK-SP B9+MARG 0.6-1: CPT | Performed by: SURGERY

## 2023-02-22 RX ORDER — SODIUM CHLORIDE 0.9 % (FLUSH) 0.9 %
10 SYRINGE (ML) INJECTION AS NEEDED
Status: DISCONTINUED | OUTPATIENT
Start: 2023-02-22 | End: 2023-02-22 | Stop reason: HOSPADM

## 2023-02-22 RX ORDER — MIDAZOLAM HYDROCHLORIDE 1 MG/ML
0.5 INJECTION INTRAMUSCULAR; INTRAVENOUS
Status: DISCONTINUED | OUTPATIENT
Start: 2023-02-22 | End: 2023-02-22 | Stop reason: HOSPADM

## 2023-02-22 RX ORDER — FENTANYL CITRATE 50 UG/ML
25 INJECTION, SOLUTION INTRAMUSCULAR; INTRAVENOUS
Status: DISCONTINUED | OUTPATIENT
Start: 2023-02-22 | End: 2023-02-22 | Stop reason: HOSPADM

## 2023-02-22 RX ORDER — CLINDAMYCIN PHOSPHATE 900 MG/50ML
900 INJECTION, SOLUTION INTRAVENOUS ONCE
Status: COMPLETED | OUTPATIENT
Start: 2023-02-22 | End: 2023-02-22

## 2023-02-22 RX ORDER — DIPHENHYDRAMINE HYDROCHLORIDE 50 MG/ML
12.5 INJECTION INTRAMUSCULAR; INTRAVENOUS
Status: DISCONTINUED | OUTPATIENT
Start: 2023-02-22 | End: 2023-02-22 | Stop reason: HOSPADM

## 2023-02-22 RX ORDER — SODIUM CHLORIDE, SODIUM LACTATE, POTASSIUM CHLORIDE, CALCIUM CHLORIDE 600; 310; 30; 20 MG/100ML; MG/100ML; MG/100ML; MG/100ML
9 INJECTION, SOLUTION INTRAVENOUS CONTINUOUS
Status: DISCONTINUED | OUTPATIENT
Start: 2023-02-22 | End: 2023-02-22 | Stop reason: HOSPADM

## 2023-02-22 RX ORDER — ONDANSETRON 2 MG/ML
4 INJECTION INTRAMUSCULAR; INTRAVENOUS ONCE AS NEEDED
Status: COMPLETED | OUTPATIENT
Start: 2023-02-22 | End: 2023-02-22

## 2023-02-22 RX ORDER — DEXAMETHASONE SODIUM PHOSPHATE 4 MG/ML
8 INJECTION, SOLUTION INTRA-ARTICULAR; INTRALESIONAL; INTRAMUSCULAR; INTRAVENOUS; SOFT TISSUE ONCE AS NEEDED
Status: DISCONTINUED | OUTPATIENT
Start: 2023-02-22 | End: 2023-02-22 | Stop reason: HOSPADM

## 2023-02-22 RX ORDER — LIDOCAINE HYDROCHLORIDE 10 MG/ML
0.5 INJECTION, SOLUTION EPIDURAL; INFILTRATION; INTRACAUDAL; PERINEURAL ONCE AS NEEDED
Status: DISCONTINUED | OUTPATIENT
Start: 2023-02-22 | End: 2023-02-22 | Stop reason: HOSPADM

## 2023-02-22 RX ORDER — LIDOCAINE HYDROCHLORIDE AND EPINEPHRINE 10; 10 MG/ML; UG/ML
INJECTION, SOLUTION INFILTRATION; PERINEURAL AS NEEDED
Status: DISCONTINUED | OUTPATIENT
Start: 2023-02-22 | End: 2023-02-22 | Stop reason: HOSPADM

## 2023-02-22 RX ORDER — SODIUM CHLORIDE 0.9 % (FLUSH) 0.9 %
10 SYRINGE (ML) INJECTION EVERY 12 HOURS SCHEDULED
Status: DISCONTINUED | OUTPATIENT
Start: 2023-02-22 | End: 2023-02-22 | Stop reason: HOSPADM

## 2023-02-22 RX ORDER — LIDOCAINE HYDROCHLORIDE 20 MG/ML
INJECTION, SOLUTION INFILTRATION; PERINEURAL AS NEEDED
Status: DISCONTINUED | OUTPATIENT
Start: 2023-02-22 | End: 2023-02-22 | Stop reason: SURG

## 2023-02-22 RX ORDER — BUPIVACAINE HYDROCHLORIDE 5 MG/ML
INJECTION, SOLUTION EPIDURAL; INTRACAUDAL AS NEEDED
Status: DISCONTINUED | OUTPATIENT
Start: 2023-02-22 | End: 2023-02-22 | Stop reason: HOSPADM

## 2023-02-22 RX ORDER — PROMETHAZINE HYDROCHLORIDE 12.5 MG/1
25 TABLET ORAL ONCE AS NEEDED
Status: DISCONTINUED | OUTPATIENT
Start: 2023-02-22 | End: 2023-02-22 | Stop reason: HOSPADM

## 2023-02-22 RX ORDER — FAMOTIDINE 10 MG/ML
20 INJECTION, SOLUTION INTRAVENOUS ONCE
Status: DISCONTINUED | OUTPATIENT
Start: 2023-02-22 | End: 2023-02-22 | Stop reason: HOSPADM

## 2023-02-22 RX ORDER — FLUMAZENIL 0.1 MG/ML
0.2 INJECTION INTRAVENOUS AS NEEDED
Status: DISCONTINUED | OUTPATIENT
Start: 2023-02-22 | End: 2023-02-22 | Stop reason: HOSPADM

## 2023-02-22 RX ORDER — SODIUM CHLORIDE 9 MG/ML
40 INJECTION, SOLUTION INTRAVENOUS AS NEEDED
Status: DISCONTINUED | OUTPATIENT
Start: 2023-02-22 | End: 2023-02-22 | Stop reason: HOSPADM

## 2023-02-22 RX ORDER — FAMOTIDINE 10 MG/ML
20 INJECTION, SOLUTION INTRAVENOUS
Status: COMPLETED | OUTPATIENT
Start: 2023-02-22 | End: 2023-02-22

## 2023-02-22 RX ORDER — MAGNESIUM HYDROXIDE 1200 MG/15ML
LIQUID ORAL AS NEEDED
Status: DISCONTINUED | OUTPATIENT
Start: 2023-02-22 | End: 2023-02-22 | Stop reason: HOSPADM

## 2023-02-22 RX ORDER — PROPOFOL 10 MG/ML
VIAL (ML) INTRAVENOUS AS NEEDED
Status: DISCONTINUED | OUTPATIENT
Start: 2023-02-22 | End: 2023-02-22 | Stop reason: SURG

## 2023-02-22 RX ORDER — MIDAZOLAM HYDROCHLORIDE 1 MG/ML
1 INJECTION INTRAMUSCULAR; INTRAVENOUS
Status: DISCONTINUED | OUTPATIENT
Start: 2023-02-22 | End: 2023-02-22 | Stop reason: HOSPADM

## 2023-02-22 RX ORDER — NALOXONE HCL 0.4 MG/ML
0.2 VIAL (ML) INJECTION AS NEEDED
Status: DISCONTINUED | OUTPATIENT
Start: 2023-02-22 | End: 2023-02-22 | Stop reason: HOSPADM

## 2023-02-22 RX ORDER — DROPERIDOL 2.5 MG/ML
0.62 INJECTION, SOLUTION INTRAMUSCULAR; INTRAVENOUS
Status: DISCONTINUED | OUTPATIENT
Start: 2023-02-22 | End: 2023-02-22 | Stop reason: HOSPADM

## 2023-02-22 RX ORDER — ONDANSETRON 2 MG/ML
4 INJECTION INTRAMUSCULAR; INTRAVENOUS ONCE AS NEEDED
Status: DISCONTINUED | OUTPATIENT
Start: 2023-02-22 | End: 2023-02-22 | Stop reason: HOSPADM

## 2023-02-22 RX ORDER — SODIUM CHLORIDE, SODIUM LACTATE, POTASSIUM CHLORIDE, CALCIUM CHLORIDE 600; 310; 30; 20 MG/100ML; MG/100ML; MG/100ML; MG/100ML
9 INJECTION, SOLUTION INTRAVENOUS CONTINUOUS PRN
Status: DISCONTINUED | OUTPATIENT
Start: 2023-02-22 | End: 2023-02-22 | Stop reason: HOSPADM

## 2023-02-22 RX ORDER — PROMETHAZINE HYDROCHLORIDE 25 MG/1
25 SUPPOSITORY RECTAL ONCE AS NEEDED
Status: DISCONTINUED | OUTPATIENT
Start: 2023-02-22 | End: 2023-02-22 | Stop reason: HOSPADM

## 2023-02-22 RX ORDER — DROPERIDOL 2.5 MG/ML
0.62 INJECTION, SOLUTION INTRAMUSCULAR; INTRAVENOUS ONCE AS NEEDED
Status: DISCONTINUED | OUTPATIENT
Start: 2023-02-22 | End: 2023-02-22 | Stop reason: HOSPADM

## 2023-02-22 RX ADMIN — FAMOTIDINE 20 MG: 10 INJECTION, SOLUTION INTRAVENOUS at 10:45

## 2023-02-22 RX ADMIN — PROPOFOL 120 MCG/KG/MIN: 10 INJECTION, EMULSION INTRAVENOUS at 12:09

## 2023-02-22 RX ADMIN — PROPOFOL 120 MG: 10 INJECTION, EMULSION INTRAVENOUS at 12:09

## 2023-02-22 RX ADMIN — ONDANSETRON 4 MG: 2 INJECTION INTRAMUSCULAR; INTRAVENOUS at 11:21

## 2023-02-22 RX ADMIN — CLINDAMYCIN PHOSPHATE 900 MG: 900 INJECTION, SOLUTION INTRAVENOUS at 12:06

## 2023-02-22 RX ADMIN — LIDOCAINE HYDROCHLORIDE 80 MG: 20 INJECTION, SOLUTION INFILTRATION; PERINEURAL at 12:09

## 2023-02-22 RX ADMIN — SODIUM CHLORIDE, POTASSIUM CHLORIDE, SODIUM LACTATE AND CALCIUM CHLORIDE 9 ML/HR: 600; 310; 30; 20 INJECTION, SOLUTION INTRAVENOUS at 10:44

## 2023-02-22 NOTE — OP NOTE
GENERAL SURGERY :  Judi  Jeanne Wei  1980    Procedure Date: 02/22/23    Pre-op Diagnosis: epidermoid cysts of the scalp times 7    Post-op Diagnosis: same    Procedure: excision of 7 epidermoid cysts of the scalp    Surgeon: Judi    Assistant: none    Estimated Blood Loss:  minimal    Complications:  None     Specimen:  There were a total of 7 epidermoid cysts of the scalp.  Two were 2 cm x 2 cm x 2 cm and the rest were less than 1 cm in size.    Findings: There were a total of 7 epidermoid cysts of the scalp.  Two were 2 cm x 2 cm x 2 cm and the rest were less than 1 cm in size.    Clinical Note: Epidermoid cyst of the scalp x7 epidermoid cyst of the scalp x7 there were a total of 7 epidermoid cyst of the scalp Jeanne presented to my office with the aforementioned complaints.  I discussed with her the benefits risks of excising this in surgery due to the large number.  Benefits risk not limited to including: Bleeding, infection, recurrence, anesthesia complications.  She appeared to understand and is willing to proceed.    Procedure:  Jeanne was taken to the operative suite and placed in supine position.  She was given MAC anesthesia with appropriate cardiopulmonary.  She was prepped and draped in usual sterile fashion.  After appropriate timeout was performed local was injected along all the cyst.  An incision was then made and all the incisions were excised using blunt dissection.  Hemostasis was perfected using Bovie cautery.  The incisions were then closed using 4-0 Prolene suture.  Hemostasis was maintained throughout the procedure.  Once all of the cyst were removed and the lesions were closed bacitracin oint was placed over each lesion.  Jeanne was then taken to the recovery area in stable postoperative condition having tolerated her procedure well.        Gabby Lau,   12:35 EST

## 2023-02-22 NOTE — H&P
Jeanne Wei 42 y.o. female presents @ the req of Dr. Lopes for eval of multiple masses on scalp. Pt reports they have increased in number, size, and are painful.   Chief Complaint   Patient presents with   • Mass     Multiple masses on scalp             HPI   Above-noted agree.  This very pleasant 42-year-old female has multiple epidermoid cyst on her scalp.  They are growing in size and painful.  They have been there for many years.  She tolerates a regular diet without nausea or vomiting.  She was her bowels with difficulty.  She has a chest pain or shortness of breath.  She has no other complaints.      Review of Systems   All other systems reviewed and are negative.            Current Outpatient Medications:   •  esomeprazole (nexIUM) 20 MG capsule, Take 20 mg by mouth Daily As Needed., Disp: , Rfl:   •  estradiol (VIVELLE-DOT) 0.025 MG/24HR patch, Place 1 patch on the skin as directed by provider 2 (Two) Times a Week., Disp: , Rfl:   •  Fexofenadine HCl (MUCINEX ALLERGY PO), Take  by mouth As Needed., Disp: , Rfl:   •  flecainide (TAMBOCOR) 50 MG tablet, Take 50 mg by mouth Every 12 (Twelve) Hours., Disp: , Rfl:         Allergies   Allergen Reactions   • Amoxicillin Nausea And Vomiting   • Penicillins Other (See Comments)     Rash,migraine,vomiting   • Percocet [Oxycodone-Acetaminophen] Nausea Only           Past Medical History:   Diagnosis Date   • Chest pain    • Dizziness    • GERD (gastroesophageal reflux disease)    • Hypertension    • Palpitation    • PVC (premature ventricular contraction)    • Tachycardia    • Unifocal PVCs            Past Surgical History:   Procedure Laterality Date   • ENDOSCOPY  2006           Social History     Tobacco Use   • Smoking status: Never   • Smokeless tobacco: Never   • Tobacco comments:     caffeine on occas.    Vaping Use   • Vaping Use: Never used   Substance Use Topics   • Alcohol use: No   • Drug use: Defer             There is no immunization history on  "file for this patient.        Physical Exam  Vitals and nursing note reviewed.   Constitutional:       Appearance: Normal appearance.   HENT:      Head:      Comments: There are multiple cysts on the scalp ranging in size from 2 cm to less than 1 cm  Cardiovascular:      Rate and Rhythm: Normal rate and regular rhythm.   Pulmonary:      Effort: Pulmonary effort is normal.      Breath sounds: Normal breath sounds.   Abdominal:      General: Bowel sounds are normal.      Palpations: Abdomen is soft.   Musculoskeletal:         General: No swelling or tenderness.   Skin:     General: Skin is warm and dry.   Neurological:      General: No focal deficit present.      Mental Status: She is alert and oriented to person, place, and time.   Psychiatric:         Mood and Affect: Mood normal.         Behavior: Behavior normal.         Debilities/Disabilities Identified: None    Emotional Behavior: Appropriate      /74   Pulse 72   Resp 16   Ht 185.4 cm (73\")   Wt 102 kg (224 lb)   BMI 29.55 kg/m²         Diagnoses and all orders for this visit:    1. Epidermoid cyst of skin of scalp (Primary)    Due to the large number of cysts we will excise this in surgery with anesthesia.  We discussed the benefits risks not limited to including: Bleeding, infection, recurrence, anesthesia complications.  Jeanne appeared to understand and is want to proceed.    Thank you for allowing me to participate in the care of this interesting patient.            "

## 2023-02-22 NOTE — ANESTHESIA POSTPROCEDURE EVALUATION
Patient: Jeanne Wei    Procedure Summary     Date: 02/22/23 Room / Location: [facility] OR [facility] / [facility]    Anesthesia Start: 1202 Anesthesia Stop: 1241    Procedure: excision of multiple scalp cysts Diagnosis:       Epidermoid cyst of skin of scalp      (Epidermoid cyst of skin of scalp [L72.0])    Surgeons: Gabby Lau DO Provider: Maria De Jesus Aviles MD    Anesthesia Type: MAC ASA Status: 2          Anesthesia Type: MAC    Vitals  Vitals Value Taken Time   /79 02/22/23 1241   Temp 36.1 °C (97 °F) 02/22/23 1241   Pulse 96 02/22/23 1241   Resp 16 02/22/23 1241   SpO2 98 % 02/22/23 1241           Post Anesthesia Care and Evaluation    Patient location during evaluation: bedside  Patient participation: complete - patient participated  Level of consciousness: awake and alert  Pain management: adequate    Airway patency: patent  Anesthetic complications: No anesthetic complications  PONV Status: controlled  Cardiovascular status: acceptable  Respiratory status: acceptable  Hydration status: acceptable

## 2023-02-22 NOTE — ANESTHESIA PREPROCEDURE EVALUATION
" Anesthesia Evaluation     Patient summary reviewed and Nursing notes reviewed   history of anesthetic complications: PONV  NPO Solid Status: > 8 hours  NPO Liquid Status: > 2 hours           Airway   Mallampati: II  TM distance: >3 FB  Neck ROM: full  No difficulty expected  Dental      Pulmonary    Cardiovascular     ECG reviewed    (+) hypertension, dysrhythmias (PVC suppression with flec per cards) PVC,       Neuro/Psych  (+) dizziness/light headedness, numbness (R sciatica),    GI/Hepatic/Renal/Endo    (+)  GERD well controlled,      Musculoskeletal     Abdominal    Substance History      OB/GYN          Other                        Anesthesia Plan    ASA 2     MAC     (I have reviewed the patient's history and chart with the patient, including all pertinent laboratory results and imaging. I have explained the risks of anesthesia including but not limited to dental damage, corneal abrasion, nerve injury, MI, stroke, aspiration, and death. Patient has agreed to proceed.     /95 (BP Location: Left arm, Patient Position: Lying)   Pulse 88   Temp 36.7 °C (98 °F) (Tympanic)   Resp 16   Ht 185.4 cm (73\")   Wt 101 kg (223 lb)   LMP 07/01/2018 (Approximate)   SpO2 100%   BMI 29.42 kg/m²   )  intravenous induction     Anesthetic plan, risks, benefits, and alternatives have been provided, discussed and informed consent has been obtained with: patient.        CODE STATUS:       "

## 2023-02-23 LAB
LAB AP CASE REPORT: NORMAL
PATH REPORT.FINAL DX SPEC: NORMAL
PATH REPORT.GROSS SPEC: NORMAL

## 2023-03-10 ENCOUNTER — OFFICE VISIT (OUTPATIENT)
Dept: SURGERY | Facility: CLINIC | Age: 43
End: 2023-03-10
Payer: COMMERCIAL

## 2023-03-10 DIAGNOSIS — Z98.890 STATUS POST EXCISIONAL BIOPSY: Primary | ICD-10-CM

## 2023-03-10 NOTE — PROGRESS NOTES
Jeanne Wei 42 y.o. female presents for PO FU exc multiple scalp masses.       HPI   Above noted and agree.      Review of Systems        Past Medical History:   Diagnosis Date   • Chest pain     none recently   • Dizziness    • GERD (gastroesophageal reflux disease)    • History of anemia    • History of migraine    • Hypertension     h/o, no meds about 2 years   • Palpitation    • PONV (postoperative nausea and vomiting)    • Sciatic pain, right 11/2022    since hysterectomy   • Tachycardia     follows / [facility]   • Unifocal PVCs            Past Surgical History:   Procedure Laterality Date   • ENDOSCOPY  2006   • HEAD/NECK LESION/CYST EXCISION N/A 2/22/2023    Procedure: excision of multiple scalp cysts;  Surgeon: Gabby Lau DO;  [place] [facility] [place];  Service: General;  Laterality: N/A;   • HYSTERECTOMY  11/11/2022   • OOPHORECTOMY Left 10/2021           Physical Exam    The wounds are healed and the sutures were removed    LMP 07/01/2018 (Approximate)         Diagnoses and all orders for this visit:    1. Status post excisional biopsy (Primary)    Jeanne may return anytime as needed.    Thank you for allowing me to participate in the care of this interesting patient.

## 2023-04-19 ENCOUNTER — OFFICE VISIT (OUTPATIENT)
Dept: CARDIOLOGY | Facility: CLINIC | Age: 43
End: 2023-04-19
Payer: COMMERCIAL

## 2023-04-19 VITALS
WEIGHT: 210 LBS | BODY MASS INDEX: 28.44 KG/M2 | HEIGHT: 72 IN | HEART RATE: 76 BPM | DIASTOLIC BLOOD PRESSURE: 76 MMHG | SYSTOLIC BLOOD PRESSURE: 118 MMHG

## 2023-04-19 DIAGNOSIS — I49.3 UNIFOCAL PVCS: Primary | ICD-10-CM

## 2023-04-19 DIAGNOSIS — R00.2 PALPITATIONS: ICD-10-CM

## 2023-04-19 PROCEDURE — 93000 ELECTROCARDIOGRAM COMPLETE: CPT | Performed by: NURSE PRACTITIONER

## 2023-04-19 PROCEDURE — 99213 OFFICE O/P EST LOW 20 MIN: CPT | Performed by: NURSE PRACTITIONER

## 2023-04-19 RX ORDER — FLECAINIDE ACETATE 50 MG/1
50 TABLET ORAL DAILY
Qty: 90 TABLET | Refills: 3 | Status: SHIPPED | OUTPATIENT
Start: 2023-04-19

## 2023-04-19 NOTE — PROGRESS NOTES
Date of Office Visit: 2023  Encounter Provider: OSVALDO Harper  Place of Service: [facility] CARDIOLOGY  Patient Name: Jeanne Wei  :1980    Chief Complaint   Patient presents with   • Unifocal PVCs      1 year f/u   :     HPI: Jeanne Wei is a 42 y.o. female who follows with Dr. Rodriguez---PVC's and palpitations---well controlled on low dose flecainide.     Presents for annual follow up.     She had a hysterectomy and is doing much better---thinks most of this was hormone related.     She is only take 25 mg of flec daily. She tried stopping all together but had some palps.     No chest pain, dyspnea, PND, orthopnea or edema.    She gained 20 lbs after hysterectomy, working with a weight loss center---down 17 lbs.     Past Medical History:   Diagnosis Date   • Chest pain     none recently   • Dizziness    • GERD (gastroesophageal reflux disease)    • History of anemia    • History of migraine    • Hypertension     h/o, no meds about 2 years   • Palpitation    • PONV (postoperative nausea and vomiting)    • Sciatic pain, right 2022    since hysterectomy   • Tachycardia     follows / [facility]   • Unifocal PVCs        Past Surgical History:   Procedure Laterality Date   • ENDOSCOPY     • HEAD/NECK LESION/CYST EXCISION N/A 2023    Procedure: excision of multiple scalp cysts;  Surgeon: Gabby Lau DO;  Location: [facility] [place] [place];  Service: General;  Laterality: N/A;   • HYSTERECTOMY  2022   • OOPHORECTOMY Left 10/2021       Social History     Socioeconomic History   • Marital status:    Tobacco Use   • Smoking status: Never   • Smokeless tobacco: Never   • Tobacco comments:     caffeine on occas.    Vaping Use   • Vaping Use: Never used   Substance and Sexual Activity   • Alcohol use: No   • Drug use: Defer   • Sexual activity: Defer       Family History   Problem Relation Age of Onset   • Sarcoidosis Mother    •  "Hypertension Mother    • Hypertension Father    • Heart disease Maternal Grandmother    • Stroke Maternal Grandmother    • Heart disease Maternal Grandfather    • Diabetes Maternal Grandfather    • Stroke Maternal Grandfather    • Heart disease Paternal Grandmother    • Heart disease Paternal Grandfather    • Diabetes Paternal Grandfather    • Malig Hyperthermia Neg Hx        Review of Systems   Constitutional: Negative for chills, fever and malaise/fatigue.   Cardiovascular: Positive for palpitations (occasional). Negative for chest pain, dyspnea on exertion, leg swelling, near-syncope, orthopnea, paroxysmal nocturnal dyspnea and syncope.   Respiratory: Negative for cough and shortness of breath.    Musculoskeletal: Negative for joint pain, joint swelling and myalgias.   Gastrointestinal: Negative for abdominal pain, diarrhea, melena, nausea and vomiting.   Genitourinary: Negative for frequency and hematuria.   Neurological: Negative for light-headedness, numbness, paresthesias and seizures.   Allergic/Immunologic: Negative.    All other systems reviewed and are negative.      Allergies   Allergen Reactions   • Ciprofloxacin Itching   • Amoxicillin Nausea And Vomiting   • Penicillins Other (See Comments)     Rash,migraine,vomiting   • Percocet [Oxycodone-Acetaminophen] Nausea Only   • Hydrocodone Nausea Only         Current Outpatient Medications:   •  estradiol (VIVELLE-DOT) 0.025 MG/24HR patch, Place 1 patch on the skin as directed by provider 2 (Two) Times a Week., Disp: , Rfl:   •  Fexofenadine HCl (MUCINEX ALLERGY PO), Take 1 tablet by mouth Daily As Needed (allergies)., Disp: , Rfl:   •  flecainide (TAMBOCOR) 50 MG tablet, Take 1 tablet by mouth Daily., Disp: 90 tablet, Rfl: 3      Objective:     Vitals:    04/19/23 0825   BP: 118/76   BP Location: Left arm   Patient Position: Sitting   Pulse: 76   Weight: 95.3 kg (210 lb)   Height: 185.4 cm (73\")     Body mass index is 27.71 kg/m².    PHYSICAL " EXAM:    Vitals Reviewed.   General Appearance: No acute distress, well developed and well nourished.   Eyes: Conjunctiva and lids: No erythema, swelling, or discharge. Sclera non-icteric.   HENT: Atraumatic, normocephalic. External eyes, ears, and nose normal.   Respiratory: No signs of respiratory distress. Respiration rhythm and depth normal.   Clear to auscultation. No rales, crackles, rhonchi, or wheezing auscultated.   Cardiovascular:  Heart Rate and Rhythm: Normal, Heart Sounds: Normal S1 and S2. No S3 or S4 noted.  Murmurs: No murmurs noted. No rubs, thrills, or gallops.   Arterial Pulses:  Posterior tibialis and dorsalis pedis pulses normal.   Lower Extremities: No edema noted.  Gastrointestinal:  Abdomen soft, non-distended, non-tender.   Musculoskeletal: Normal movement of extremities  Skin: Warm and dry.   Psychiatric: Patient alert and oriented to person, place, and time. Speech and behavior appropriate. Normal mood and affect.       ECG 12 Lead    Date/Time: 4/19/2023 8:36 AM  Performed by: Guillermina Fox APRN  Authorized by: Guillermina Fox APRN   Comparison: compared with previous ECG   Similar to previous ECG  Rhythm: sinus rhythm  BPM: 78                Assessment:       Diagnosis Plan   1. Unifocal PVCs        2. Palpitations               Plan:       1.-2. PVC's, palps---well controlled on low dose flec ---likely hormone related, much improved after hysterctomy---she is taking 25 mg daily---we discussed stopping and using as needed if she wants to try that--prefers to stay on low dose for now, which is fine.QRS is normal.     Follow up in 1 year with Dr. Rodriguez.     As always, it has been a pleasure to participate in your patient's care.      Sincerely,         OSVALDO Barreto

## 2023-07-28 ENCOUNTER — OFFICE VISIT (OUTPATIENT)
Dept: SURGERY | Facility: CLINIC | Age: 43
End: 2023-07-28
Payer: COMMERCIAL

## 2023-07-28 VITALS
WEIGHT: 210 LBS | RESPIRATION RATE: 16 BRPM | HEART RATE: 72 BPM | DIASTOLIC BLOOD PRESSURE: 72 MMHG | BODY MASS INDEX: 28.44 KG/M2 | HEIGHT: 72 IN | SYSTOLIC BLOOD PRESSURE: 112 MMHG

## 2023-07-28 DIAGNOSIS — L72.11 PILAR CYSTS: Primary | ICD-10-CM

## 2023-07-28 NOTE — LETTER
July 28, 2023       No Recipients    Patient: Jeanne Wei   YOB: 1980   Date of Visit: 7/28/2023     Dear Cuba Lopes DO:       Thank you for referring Jeanne Wei to me for evaluation. Below are the relevant portions of my assessment and plan of care.    If you have questions, please do not hesitate to call me. I look forward to following Jeanne along with you.         Sincerely,        Gabby Lau DO        CC:   No Recipients    Gabby Lau DO  07/28/23 1121  Sign when Signing Visit  Jeanne Wei 42 y.o. female presents FOR EVAL 2 SCALP CYSTS.           HPI   Above noted and agree.  Jeanne is known to my service.  In February I removed multiple pilar cysts of her scalp.  She is here with two new ones and would like them to be removed.      Review of Systems          Current Outpatient Medications:   •  estradiol (VIVELLE-DOT) 0.025 MG/24HR patch, Place 1 patch on the skin as directed by provider 2 (Two) Times a Week., Disp: , Rfl:   •  Fexofenadine HCl (MUCINEX ALLERGY PO), Take 1 tablet by mouth Daily As Needed (allergies)., Disp: , Rfl:   •  flecainide (TAMBOCOR) 50 MG tablet, Take 1 tablet by mouth Daily., Disp: 90 tablet, Rfl: 3        Allergies   Allergen Reactions   • Ciprofloxacin Itching   • Amoxicillin Nausea And Vomiting   • Penicillins Other (See Comments)     Rash,migraine,vomiting   • Percocet [Oxycodone-Acetaminophen] Nausea Only   • Hydrocodone Nausea Only           Past Medical History:   Diagnosis Date   • Chest pain     none recently   • Dizziness    • GERD (gastroesophageal reflux disease)    • History of anemia    • History of migraine    • Hypertension     h/o, no meds about 2 years   • Palpitation    • PONV (postoperative nausea and vomiting)    • Sciatic pain, right 11/2022    since hysterectomy   • Tachycardia     follows / [facility]   • Unifocal PVCs            Past Surgical History:   Procedure Laterality Date  "  • ENDOSCOPY     • HEAD/NECK LESION/CYST EXCISION N/A 2023    Procedure: excision of multiple scalp cysts;  Surgeon: Gabby Lau DO;  [place] [facility] [place];  Service: General;  Laterality: N/A;   • HYSTERECTOMY  2022   • OOPHORECTOMY Left 10/2021           Social History     Tobacco Use   • Smoking status: Never   • Smokeless tobacco: Never   • Tobacco comments:     caffeine on occas.    Vaping Use   • Vaping Use: Never used   Substance Use Topics   • Alcohol use: No   • Drug use: Defer             There is no immunization history on file for this patient.        Physical Exam    I discussed with the patient the benefits and risks of excising this lesion.   Risks not limited to but including bleeding, infection, having to excise more if the lesion turns out to be cancer.  The patient appeared to understand and signed informed consent.  The area was prepped and draped in the usual sterile fashion.  Local was injected into the area to be excised.  The lesion was then excised.  Hemostasis was perfected.  The wound was then closed using simple sutures.  Sterile dressings were applied.  The patient tolerated the procedure well without complication.  Wound care instructions were then given to the patient.  The cysts were pilar scalp cysts measurin cm x 1 cm x 1 cm and 0.5 cm x 0.5 cm x 0.5 cm    Debilities/Disabilities Identified: None    Emotional Behavior: Appropriate      /72   Pulse 72   Resp 16   Ht 185.4 cm (73\")   Wt 95.3 kg (210 lb)   LMP 2018 (Approximate)   BMI 27.71 kg/m²         Diagnoses and all orders for this visit:    1. Pilar cysts (Primary)    We will remove the sutures next week.    Thank you for allowing me to participate in the care of this interesting patient.         "

## 2023-07-28 NOTE — PROGRESS NOTES
Jeanne Wei 42 y.o. female presents FOR EVAL 2 SCALP CYSTS.           HPI   Above noted and agree.  Jeanne is known to my service.  In February I removed multiple pilar cysts of her scalp.  She is here with two new ones and would like them to be removed.      Review of Systems          Current Outpatient Medications:     estradiol (VIVELLE-DOT) 0.025 MG/24HR patch, Place 1 patch on the skin as directed by provider 2 (Two) Times a Week., Disp: , Rfl:     Fexofenadine HCl (MUCINEX ALLERGY PO), Take 1 tablet by mouth Daily As Needed (allergies)., Disp: , Rfl:     flecainide (TAMBOCOR) 50 MG tablet, Take 1 tablet by mouth Daily., Disp: 90 tablet, Rfl: 3        Allergies   Allergen Reactions    Ciprofloxacin Itching    Amoxicillin Nausea And Vomiting    Penicillins Other (See Comments)     Rash,migraine,vomiting    Percocet [Oxycodone-Acetaminophen] Nausea Only    Hydrocodone Nausea Only           Past Medical History:   Diagnosis Date    Chest pain     none recently    Dizziness     GERD (gastroesophageal reflux disease)     History of anemia     History of migraine     Hypertension     h/o, no meds about 2 years    Palpitation     PONV (postoperative nausea and vomiting)     Sciatic pain, right 11/2022    since hysterectomy    Tachycardia     follows / [facility]    Unifocal PVCs            Past Surgical History:   Procedure Laterality Date    ENDOSCOPY  2006    HEAD/NECK LESION/CYST EXCISION N/A 2/22/2023    Procedure: excision of multiple scalp cysts;  Surgeon: Gabby Lau DO;  Location: [facility] [place] [facility];  Service: General;  Laterality: N/A;    HYSTERECTOMY  11/11/2022    OOPHORECTOMY Left 10/2021           Social History     Tobacco Use    Smoking status: Never    Smokeless tobacco: Never    Tobacco comments:     caffeine on occas.    Vaping Use    Vaping Use: Never used   Substance Use Topics    Alcohol use: No    Drug use: Defer             There is no immunization history on file for  "this patient.        Physical Exam    I discussed with the patient the benefits and risks of excising this lesion.   Risks not limited to but including bleeding, infection, having to excise more if the lesion turns out to be cancer.  The patient appeared to understand and signed informed consent.  The area was prepped and draped in the usual sterile fashion.  Local was injected into the area to be excised.  The lesion was then excised.  Hemostasis was perfected.  The wound was then closed using simple sutures.  Sterile dressings were applied.  The patient tolerated the procedure well without complication.  Wound care instructions were then given to the patient.  The cysts were pilar scalp cysts measurin cm x 1 cm x 1 cm and 0.5 cm x 0.5 cm x 0.5 cm    Debilities/Disabilities Identified: None    Emotional Behavior: Appropriate      /72   Pulse 72   Resp 16   Ht 185.4 cm (73\")   Wt 95.3 kg (210 lb)   LMP 2018 (Approximate)   BMI 27.71 kg/m²         Diagnoses and all orders for this visit:    1. Pilar cysts (Primary)    We will remove the sutures next week.    Thank you for allowing me to participate in the care of this interesting patient.         "

## 2023-08-04 ENCOUNTER — OFFICE VISIT (OUTPATIENT)
Dept: SURGERY | Facility: CLINIC | Age: 43
End: 2023-08-04
Payer: COMMERCIAL

## 2023-08-04 DIAGNOSIS — Z98.890 STATUS POST EXCISIONAL BIOPSY: Primary | ICD-10-CM

## 2024-04-15 ENCOUNTER — TELEPHONE (OUTPATIENT)
Dept: CARDIOLOGY | Facility: CLINIC | Age: 44
End: 2024-04-15

## 2024-04-15 NOTE — TELEPHONE ENCOUNTER
Caller: WHITNEY    Relationship to patient: SELF    Best call back number: [phone]      If rescheduling, when is the original appointment: 4/22/24    Additional notes: UNABLE TO MAKE APPT ON 4/22/24----REALLY EARLY IN THE MORNING 8:00 OR THE LATEST APPT AVAILABLE

## 2024-05-31 ENCOUNTER — OFFICE VISIT (OUTPATIENT)
Age: 44
End: 2024-05-31
Payer: COMMERCIAL

## 2024-05-31 VITALS
HEART RATE: 77 BPM | BODY MASS INDEX: 29.26 KG/M2 | WEIGHT: 216 LBS | HEIGHT: 72 IN | DIASTOLIC BLOOD PRESSURE: 84 MMHG | SYSTOLIC BLOOD PRESSURE: 124 MMHG

## 2024-05-31 DIAGNOSIS — R00.2 PALPITATIONS: ICD-10-CM

## 2024-05-31 DIAGNOSIS — I49.3 UNIFOCAL PVCS: Primary | ICD-10-CM

## 2024-05-31 DIAGNOSIS — I10 ESSENTIAL HYPERTENSION: ICD-10-CM

## 2024-05-31 PROCEDURE — 93000 ELECTROCARDIOGRAM COMPLETE: CPT | Performed by: INTERNAL MEDICINE

## 2024-05-31 PROCEDURE — 99214 OFFICE O/P EST MOD 30 MIN: CPT | Performed by: INTERNAL MEDICINE

## 2024-05-31 RX ORDER — HYDROCHLOROTHIAZIDE 25 MG/1
25 TABLET ORAL AS NEEDED
COMMUNITY
Start: 2024-04-28

## 2024-05-31 NOTE — PROGRESS NOTES
Date of Office Visit: 2024  Encounter Provider: Duarte Rodriguez MD  Place of Service: [facility] CARDIOLOGY  Patient Name: Jeanne Wei  : 1980    Subjective:     Encounter Date:2024      Patient ID: Jeanne Wei is a 43 y.o. female who has a cc of  PVCs and takes a little flec (25 mg daily)     She has some palp but not bad. The PVCs wax and wane.     The patient had a good year.   No anginal chest pain,   No sig ying,   No soa,   No fainting,  No orthostasis.   No edema.   Exercise tolerance: good. She's on an exercise program     There have been no hospital admission since the last visit.     There have been no bleeding events.       Past Medical History:   Diagnosis Date    Chest pain     none recently    Dizziness     GERD (gastroesophageal reflux disease)     History of anemia     History of migraine     Hypertension     h/o, no meds about 2 years    Palpitation     PONV (postoperative nausea and vomiting)     Sciatic pain, right 2022    since hysterectomy    Tachycardia     follows / [facility]    Unifocal PVCs        Social History     Socioeconomic History    Marital status:    Tobacco Use    Smoking status: Never    Smokeless tobacco: Never    Tobacco comments:     caffeine on occas.    Vaping Use    Vaping status: Never Used   Substance and Sexual Activity    Alcohol use: No    Drug use: Defer    Sexual activity: Defer       Family History   Problem Relation Age of Onset    Sarcoidosis Mother     Hypertension Mother     Hypertension Father     Heart disease Maternal Grandmother     Stroke Maternal Grandmother     Heart disease Maternal Grandfather     Diabetes Maternal Grandfather     Stroke Maternal Grandfather     Heart disease Paternal Grandmother     Heart disease Paternal Grandfather     Diabetes Paternal Grandfather     Malig Hyperthermia Neg Hx        Review of Systems   Constitutional: Negative for fever and night sweats.  "  HENT:  Negative for ear pain and stridor.    Eyes:  Negative for discharge and visual halos.   Cardiovascular:  Negative for cyanosis.   Respiratory:  Negative for hemoptysis and sputum production.    Hematologic/Lymphatic: Negative for adenopathy.   Skin:  Negative for nail changes and unusual hair distribution.   Musculoskeletal:  Negative for gout and joint swelling.   Gastrointestinal:  Negative for bowel incontinence and flatus.   Genitourinary:  Negative for dysuria and flank pain.   Neurological:  Negative for seizures and tremors.   Psychiatric/Behavioral:  Negative for altered mental status. The patient is not nervous/anxious.             Objective:     Vitals:    05/31/24 0839   BP: 124/84   BP Location: Left arm   Patient Position: Sitting   Pulse: 77   Weight: 98 kg (216 lb)   Height: 185.4 cm (73\")         Eyes:      General:         Right eye: No discharge.         Left eye: No discharge.   HENT:      Head: Normocephalic and atraumatic.   Neck:      Thyroid: No thyromegaly.      Vascular: No JVD.   Pulmonary:      Effort: Pulmonary effort is normal.      Breath sounds: Normal breath sounds. No rales.   Cardiovascular:      Normal rate. Regular rhythm.      No gallop.    Edema:     Peripheral edema absent.   Abdominal:      General: Bowel sounds are normal.      Palpations: Abdomen is soft.      Tenderness: There is no abdominal tenderness.   Musculoskeletal: Normal range of motion.         General: No deformity. Skin:     General: Skin is warm and dry.      Findings: No erythema.   Neurological:      Mental Status: Alert and oriented to person, place, and time.      Motor: Normal muscle tone.   Psychiatric:         Behavior: Behavior normal.         Thought Content: Thought content normal.           ECG 12 Lead    Date/Time: 5/31/2024 9:48 AM  Performed by: Duarte Rodriguez MD    Authorized by: Duarte Rodriguez MD  Rhythm: sinus rhythm  Rate: normal  Conduction: conduction normal  ST Segments: ST " segments normal  T Waves: T waves normal  QRS axis: normal    Clinical impression: normal ECG          Lab Review:       Assessment:          Diagnosis Plan   1. Unifocal PVCs        2. Palpitations        3. Essential hypertension               Plan:     She is fine     ECG and exam fine.     On tiny dose of flec which is obviously not bothering her.     We can see yearly

## 2024-08-30 RX ORDER — FLECAINIDE ACETATE 50 MG/1
50 TABLET ORAL DAILY
Qty: 90 TABLET | Refills: 3 | Status: SHIPPED | OUTPATIENT
Start: 2024-08-30

## 2024-11-15 ENCOUNTER — TELEPHONE (OUTPATIENT)
Dept: SURGERY | Facility: CLINIC | Age: 44
End: 2024-11-15
Payer: COMMERCIAL

## 2024-11-15 NOTE — TELEPHONE ENCOUNTER
Patient called to speak to you regarding more cyst popping up on her head.     She is expecting a call back at your nearest convenience.    Thank you!

## 2024-12-04 ENCOUNTER — OFFICE VISIT (OUTPATIENT)
Dept: SURGERY | Facility: CLINIC | Age: 44
End: 2024-12-04
Payer: COMMERCIAL

## 2024-12-04 VITALS
HEART RATE: 72 BPM | SYSTOLIC BLOOD PRESSURE: 122 MMHG | BODY MASS INDEX: 26.82 KG/M2 | WEIGHT: 198 LBS | DIASTOLIC BLOOD PRESSURE: 78 MMHG | HEIGHT: 72 IN | RESPIRATION RATE: 16 BRPM

## 2024-12-04 DIAGNOSIS — L72.0 EPIDERMOID CYST: Primary | ICD-10-CM

## 2024-12-04 NOTE — PROGRESS NOTES
Jeanne Wei 43 y.o. female presents for eval 2 masses on scalp.   Chief Complaint   Patient presents with    Mass     X2 scalp             HPI   Above noted and agree.      Review of Systems          Current Outpatient Medications:     estradiol (VIVELLE-DOT) 0.025 MG/24HR patch, Place 1 patch on the skin as directed by provider 2 (Two) Times a Week., Disp: , Rfl:         Allergies   Allergen Reactions    Ciprofloxacin Itching    Amoxicillin Nausea And Vomiting    Penicillins Other (See Comments)     Rash,migraine,vomiting    Percocet [Oxycodone-Acetaminophen] Nausea Only    Hydrocodone Nausea Only           Past Medical History:   Diagnosis Date    Chest pain     none recently    Dizziness     GERD (gastroesophageal reflux disease)     History of anemia     History of migraine     Hypertension     h/o, no meds about 2 years    Palpitation     PONV (postoperative nausea and vomiting)     Sciatic pain, right 11/2022    since hysterectomy    Tachycardia     follows / [facility]    Unifocal PVCs            Past Surgical History:   Procedure Laterality Date    ENDOSCOPY  2006    HEAD/NECK LESION/CYST EXCISION N/A 2/22/2023    Procedure: excision of multiple scalp cysts;  Surgeon: Gabby Lau DO;  [place]: [facility] [place];  Service: General;  Laterality: N/A;    HYSTERECTOMY  11/11/2022    OOPHORECTOMY Left 10/2021           Social History     Tobacco Use    Smoking status: Never    Smokeless tobacco: Never    Tobacco comments:     caffeine on occas.    Vaping Use    Vaping status: Never Used   Substance Use Topics    Alcohol use: No    Drug use: Defer             There is no immunization history on file for this patient.        Physical Exam    I discussed with the patient the benefits and risks of excising this lesion.   Risks not limited to but including bleeding, infection, having to excise more if the lesion turns out to be cancer.  The patient appeared to understand and signed informed  "consent.  The area was prepped and draped in the usual sterile fashion.  Local was injected into the area to be excised.  The lesion was then excised.  Hemostasis was perfected.  The wound was then closed using simple sutures.  Sterile dressings were applied.  The patient tolerated the procedure well without complication.  Wound care instructions were then given to the patient.  The lesions were epidermoid cyst of the scalp and they both measured 1 cm x 1 cm x 1 cm.    Debilities/Disabilities Identified: None    Emotional Behavior: Appropriate      /78   Pulse 72   Resp 16   Ht 185.4 cm (73\")   Wt 89.8 kg (198 lb)   LMP 07/01/2018 (Approximate)   BMI 26.12 kg/m²         Diagnoses and all orders for this visit:    1. Epidermoid cyst (Primary)    We will remove the sutures out next week.    Thank you for allowing me to participate in the care of this interesting patient.         "

## 2024-12-04 NOTE — LETTER
December 4, 2024     Cuba Lopes DO  [address] [facility] [id number]    Patient: Jeanne Wei   YOB: 1980   Date of Visit: 12/4/2024     Dear [place] Lopes DO:       Thank you for referring Jeanne Wei to me for evaluation. Below are the relevant portions of my assessment and plan of care.    If you have questions, please do not hesitate to call me. I look forward to following Jeanne along with you.         Sincerely,        Gabby Lau DO        CC: No Recipients  Gabby Lau DO  12/04/24 1605  Sign when Signing Visit  Jeanne Wei 43 y.o. female presents for eval 2 masses on scalp.   Chief Complaint   Patient presents with   • Mass     X2 scalp             HPI   Above noted and agree.      Review of Systems          Current Outpatient Medications:   •  estradiol (VIVELLE-DOT) 0.025 MG/24HR patch, Place 1 patch on the skin as directed by provider 2 (Two) Times a Week., Disp: , Rfl:         Allergies   Allergen Reactions   • Ciprofloxacin Itching   • Amoxicillin Nausea And Vomiting   • Penicillins Other (See Comments)     Rash,migraine,vomiting   • Percocet [Oxycodone-Acetaminophen] Nausea Only   • Hydrocodone Nausea Only           Past Medical History:   Diagnosis Date   • Chest pain     none recently   • Dizziness    • GERD (gastroesophageal reflux disease)    • History of anemia    • History of migraine    • Hypertension     h/o, no meds about 2 years   • Palpitation    • PONV (postoperative nausea and vomiting)    • Sciatic pain, right 11/2022    since hysterectomy   • Tachycardia     follows / [facility]   • Unifocal PVCs            Past Surgical History:   Procedure Laterality Date   • ENDOSCOPY  2006   • HEAD/NECK LESION/CYST EXCISION N/A 2/22/2023    Procedure: excision of multiple scalp cysts;  Surgeon: Gabby Lau DO;  [place] [facility];  Service: General;  Laterality: N/A;   • HYSTERECTOMY  11/11/2022  "  • OOPHORECTOMY Left 10/2021           Social History     Tobacco Use   • Smoking status: Never   • Smokeless tobacco: Never   • Tobacco comments:     caffeine on occas.    Vaping Use   • Vaping status: Never Used   Substance Use Topics   • Alcohol use: No   • Drug use: Defer             There is no immunization history on file for this patient.        Physical Exam    I discussed with the patient the benefits and risks of excising this lesion.   Risks not limited to but including bleeding, infection, having to excise more if the lesion turns out to be cancer.  The patient appeared to understand and signed informed consent.  The area was prepped and draped in the usual sterile fashion.  Local was injected into the area to be excised.  The lesion was then excised.  Hemostasis was perfected.  The wound was then closed using simple sutures.  Sterile dressings were applied.  The patient tolerated the procedure well without complication.  Wound care instructions were then given to the patient.  The lesions were epidermoid cyst of the scalp and they both measured 1 cm x 1 cm x 1 cm.    Debilities/Disabilities Identified: None    Emotional Behavior: Appropriate      /78   Pulse 72   Resp 16   Ht 185.4 cm (73\")   Wt 89.8 kg (198 lb)   LMP 07/01/2018 (Approximate)   BMI 26.12 kg/m²         Diagnoses and all orders for this visit:    1. Epidermoid cyst (Primary)    We will remove the sutures out next week.    Thank you for allowing me to participate in the care of this interesting patient.       "

## 2024-12-06 ENCOUNTER — PATIENT ROUNDING (BHMG ONLY) (OUTPATIENT)
Dept: SURGERY | Facility: CLINIC | Age: 44
End: 2024-12-06
Payer: COMMERCIAL

## 2024-12-06 NOTE — PROGRESS NOTES
December 6, 2024      I am calling to officially welcome you to our practice and ask about your recent visit. Is this a good time to talk? No. Left voicemail to call back

## (undated) DEVICE — SUREFIT, DUAL DISPERSIVE ELECTRODE, CONTACT QUALITY MONITOR: Brand: SUREFIT

## (undated) DEVICE — ELECTRD NDL EZ CLN STD 2.75IN

## (undated) DEVICE — TBG PENCL TELESCP MEGADYNE SMOKE EVAC 10FT

## (undated) DEVICE — PK MINOR PROCEDURE 46

## (undated) DEVICE — SHEET, DRAPE, SPLIT, STERILE: Brand: MEDLINE

## (undated) DEVICE — SHEET,DRAPE,53X77,STERILE: Brand: MEDLINE

## (undated) DEVICE — STERILE POLYISOPRENE POWDER-FREE SURGICAL GLOVES: Brand: PROTEXIS

## (undated) DEVICE — SUT PROLN 4/0 PS2 18IN BLU